# Patient Record
Sex: FEMALE | Race: BLACK OR AFRICAN AMERICAN | URBAN - METROPOLITAN AREA
[De-identification: names, ages, dates, MRNs, and addresses within clinical notes are randomized per-mention and may not be internally consistent; named-entity substitution may affect disease eponyms.]

---

## 2017-02-08 ENCOUNTER — TELEPHONE (OUTPATIENT)
Dept: FAMILY MEDICINE CLINIC | Age: 42
End: 2017-02-08

## 2017-02-08 DIAGNOSIS — R07.9 CHEST PAIN, UNSPECIFIED TYPE: Primary | ICD-10-CM

## 2017-02-17 ENCOUNTER — OFFICE VISIT (OUTPATIENT)
Dept: CARDIOLOGY CLINIC | Age: 42
End: 2017-02-17

## 2017-02-17 VITALS — SYSTOLIC BLOOD PRESSURE: 146 MMHG | HEART RATE: 90 BPM | DIASTOLIC BLOOD PRESSURE: 100 MMHG

## 2017-02-17 DIAGNOSIS — R07.9 CHEST PAIN, UNSPECIFIED TYPE: Primary | ICD-10-CM

## 2017-02-17 PROCEDURE — 93000 ELECTROCARDIOGRAM COMPLETE: CPT | Performed by: INTERNAL MEDICINE

## 2017-02-17 PROCEDURE — 99204 OFFICE O/P NEW MOD 45 MIN: CPT | Performed by: INTERNAL MEDICINE

## 2017-02-17 RX ORDER — IBUPROFEN 800 MG/1
800 TABLET ORAL EVERY 6 HOURS PRN
COMMUNITY
End: 2019-09-25

## 2017-03-06 ENCOUNTER — HOSPITAL ENCOUNTER (OUTPATIENT)
Dept: NON INVASIVE DIAGNOSTICS | Age: 42
Discharge: HOME | End: 2017-03-06
Attending: INTERNAL MEDICINE | Admitting: INTERNAL MEDICINE

## 2017-03-06 DIAGNOSIS — R07.9 CHEST PAIN: ICD-10-CM

## 2017-04-10 ENCOUNTER — TELEPHONE (OUTPATIENT)
Dept: FAMILY MEDICINE CLINIC | Age: 42
End: 2017-04-10

## 2017-08-02 ENCOUNTER — OFFICE VISIT (OUTPATIENT)
Dept: FAMILY MEDICINE CLINIC | Age: 42
End: 2017-08-02

## 2017-08-02 VITALS
HEIGHT: 70 IN | WEIGHT: 200.8 LBS | HEART RATE: 87 BPM | OXYGEN SATURATION: 97 % | DIASTOLIC BLOOD PRESSURE: 70 MMHG | SYSTOLIC BLOOD PRESSURE: 130 MMHG | BODY MASS INDEX: 28.75 KG/M2

## 2017-08-02 DIAGNOSIS — R53.83 FATIGUE, UNSPECIFIED TYPE: ICD-10-CM

## 2017-08-02 DIAGNOSIS — Z00.00 HEALTHCARE MAINTENANCE: Primary | ICD-10-CM

## 2017-08-02 DIAGNOSIS — E55.9 VITAMIN D DEFICIENCY: ICD-10-CM

## 2017-08-02 DIAGNOSIS — Z00.00 HEALTHCARE MAINTENANCE: ICD-10-CM

## 2017-08-02 LAB
A/G RATIO: 1.4 (ref 1.1–2.2)
ALBUMIN SERPL-MCNC: 4.3 G/DL (ref 3.4–5)
ALP BLD-CCNC: 47 U/L (ref 40–129)
ALT SERPL-CCNC: 19 U/L (ref 10–40)
ANION GAP SERPL CALCULATED.3IONS-SCNC: 13 MMOL/L (ref 3–16)
AST SERPL-CCNC: 19 U/L (ref 15–37)
BASOPHILS ABSOLUTE: 0 K/UL (ref 0–0.2)
BASOPHILS RELATIVE PERCENT: 0.4 %
BILIRUB SERPL-MCNC: 0.5 MG/DL (ref 0–1)
BUN BLDV-MCNC: 12 MG/DL (ref 7–20)
CALCIUM SERPL-MCNC: 9.5 MG/DL (ref 8.3–10.6)
CHLORIDE BLD-SCNC: 100 MMOL/L (ref 99–110)
CO2: 26 MMOL/L (ref 21–32)
CREAT SERPL-MCNC: 0.9 MG/DL (ref 0.6–1.1)
EOSINOPHILS ABSOLUTE: 0.2 K/UL (ref 0–0.6)
EOSINOPHILS RELATIVE PERCENT: 3.2 %
GFR AFRICAN AMERICAN: >60
GFR NON-AFRICAN AMERICAN: >60
GLOBULIN: 3 G/DL
GLUCOSE BLD-MCNC: 103 MG/DL (ref 70–99)
HCT VFR BLD CALC: 42.3 % (ref 36–48)
HEMOGLOBIN: 14.2 G/DL (ref 12–16)
LYMPHOCYTES ABSOLUTE: 1.6 K/UL (ref 1–5.1)
LYMPHOCYTES RELATIVE PERCENT: 30 %
MCH RBC QN AUTO: 30.3 PG (ref 26–34)
MCHC RBC AUTO-ENTMCNC: 33.6 G/DL (ref 31–36)
MCV RBC AUTO: 90 FL (ref 80–100)
MONOCYTES ABSOLUTE: 0.4 K/UL (ref 0–1.3)
MONOCYTES RELATIVE PERCENT: 8 %
NEUTROPHILS ABSOLUTE: 3 K/UL (ref 1.7–7.7)
NEUTROPHILS RELATIVE PERCENT: 58.4 %
PDW BLD-RTO: 13.8 % (ref 12.4–15.4)
PLATELET # BLD: 316 K/UL (ref 135–450)
PMV BLD AUTO: 7.8 FL (ref 5–10.5)
POTASSIUM SERPL-SCNC: 4.5 MMOL/L (ref 3.5–5.1)
RBC # BLD: 4.7 M/UL (ref 4–5.2)
SODIUM BLD-SCNC: 139 MMOL/L (ref 136–145)
T4 FREE: 1.1 NG/DL (ref 0.9–1.8)
TOTAL PROTEIN: 7.3 G/DL (ref 6.4–8.2)
TSH REFLEX: 1.28 UIU/ML (ref 0.27–4.2)
VITAMIN D 25-HYDROXY: 25.3 NG/ML
WBC # BLD: 5.2 K/UL (ref 4–11)

## 2017-08-02 PROCEDURE — 99396 PREV VISIT EST AGE 40-64: CPT | Performed by: NURSE PRACTITIONER

## 2017-08-02 ASSESSMENT — ENCOUNTER SYMPTOMS
DIARRHEA: 0
RHINORRHEA: 0
VOMITING: 0
SHORTNESS OF BREATH: 0
APNEA: 0
SINUS PRESSURE: 0
ABDOMINAL DISTENTION: 0
EYE REDNESS: 0
NAUSEA: 0
BLOOD IN STOOL: 0
BACK PAIN: 0
COUGH: 0
EYE PAIN: 0
WHEEZING: 0
CONSTIPATION: 0
CHEST TIGHTNESS: 0
ABDOMINAL PAIN: 0

## 2018-08-30 ENCOUNTER — OFFICE VISIT (OUTPATIENT)
Dept: FAMILY MEDICINE CLINIC | Age: 43
End: 2018-08-30

## 2018-08-30 VITALS
HEART RATE: 81 BPM | BODY MASS INDEX: 28.18 KG/M2 | DIASTOLIC BLOOD PRESSURE: 82 MMHG | HEIGHT: 70 IN | WEIGHT: 196.8 LBS | OXYGEN SATURATION: 98 % | SYSTOLIC BLOOD PRESSURE: 126 MMHG

## 2018-08-30 DIAGNOSIS — J45.40 MODERATE PERSISTENT ASTHMA WITHOUT COMPLICATION: ICD-10-CM

## 2018-08-30 DIAGNOSIS — R53.83 FATIGUE, UNSPECIFIED TYPE: ICD-10-CM

## 2018-08-30 DIAGNOSIS — Z00.00 ANNUAL PHYSICAL EXAM: ICD-10-CM

## 2018-08-30 DIAGNOSIS — R73.03 PREDIABETES: ICD-10-CM

## 2018-08-30 DIAGNOSIS — Z00.00 ANNUAL PHYSICAL EXAM: Primary | ICD-10-CM

## 2018-08-30 DIAGNOSIS — M54.2 CERVICALGIA: ICD-10-CM

## 2018-08-30 DIAGNOSIS — N85.2 UTERINE ENLARGEMENT: ICD-10-CM

## 2018-08-30 LAB
A/G RATIO: 1.5 (ref 1.1–2.2)
ALBUMIN SERPL-MCNC: 4.2 G/DL (ref 3.4–5)
ALP BLD-CCNC: 39 U/L (ref 40–129)
ALT SERPL-CCNC: 13 U/L (ref 10–40)
ANION GAP SERPL CALCULATED.3IONS-SCNC: 13 MMOL/L (ref 3–16)
AST SERPL-CCNC: 16 U/L (ref 15–37)
BILIRUB SERPL-MCNC: 0.3 MG/DL (ref 0–1)
BUN BLDV-MCNC: 9 MG/DL (ref 7–20)
CALCIUM SERPL-MCNC: 9.4 MG/DL (ref 8.3–10.6)
CHLORIDE BLD-SCNC: 102 MMOL/L (ref 99–110)
CHOLESTEROL, TOTAL: 213 MG/DL (ref 0–199)
CO2: 24 MMOL/L (ref 21–32)
CREAT SERPL-MCNC: 1 MG/DL (ref 0.6–1.1)
FOLATE: 17.77 NG/ML (ref 4.78–24.2)
GFR AFRICAN AMERICAN: >60
GFR NON-AFRICAN AMERICAN: >60
GLOBULIN: 2.8 G/DL
GLUCOSE BLD-MCNC: 96 MG/DL (ref 70–99)
HCT VFR BLD CALC: 44 % (ref 36–48)
HDLC SERPL-MCNC: 53 MG/DL (ref 40–60)
HEMOGLOBIN: 14.7 G/DL (ref 12–16)
LDL CHOLESTEROL CALCULATED: 143 MG/DL
MCH RBC QN AUTO: 30.5 PG (ref 26–34)
MCHC RBC AUTO-ENTMCNC: 33.5 G/DL (ref 31–36)
MCV RBC AUTO: 91 FL (ref 80–100)
PDW BLD-RTO: 12.3 % (ref 12.4–15.4)
PLATELET # BLD: 344 K/UL (ref 135–450)
PMV BLD AUTO: 8.1 FL (ref 5–10.5)
POTASSIUM SERPL-SCNC: 4.4 MMOL/L (ref 3.5–5.1)
RBC # BLD: 4.83 M/UL (ref 4–5.2)
SODIUM BLD-SCNC: 139 MMOL/L (ref 136–145)
TOTAL PROTEIN: 7 G/DL (ref 6.4–8.2)
TRIGL SERPL-MCNC: 87 MG/DL (ref 0–150)
TSH REFLEX: 1.25 UIU/ML (ref 0.27–4.2)
VITAMIN B-12: 211 PG/ML (ref 211–911)
VLDLC SERPL CALC-MCNC: 17 MG/DL
WBC # BLD: 5.8 K/UL (ref 4–11)

## 2018-08-30 PROCEDURE — 99396 PREV VISIT EST AGE 40-64: CPT | Performed by: FAMILY MEDICINE

## 2018-08-30 RX ORDER — BUDESONIDE AND FORMOTEROL FUMARATE DIHYDRATE 160; 4.5 UG/1; UG/1
2 AEROSOL RESPIRATORY (INHALATION) 2 TIMES DAILY
Qty: 1 INHALER | Refills: 11 | Status: SHIPPED | OUTPATIENT
Start: 2018-08-30 | End: 2021-07-29 | Stop reason: SDUPTHER

## 2018-08-30 RX ORDER — ALBUTEROL SULFATE 90 UG/1
2 AEROSOL, METERED RESPIRATORY (INHALATION) EVERY 4 HOURS PRN
Qty: 1 INHALER | Refills: 11 | Status: SHIPPED | OUTPATIENT
Start: 2018-08-30 | End: 2021-07-29 | Stop reason: SDUPTHER

## 2018-08-30 RX ORDER — METAXALONE 800 MG/1
800 TABLET ORAL 3 TIMES DAILY
Qty: 30 TABLET | Refills: 0 | Status: SHIPPED | OUTPATIENT
Start: 2018-08-30 | End: 2018-09-07

## 2018-08-30 RX ORDER — NAPROXEN 500 MG/1
500 TABLET ORAL 2 TIMES DAILY WITH MEALS
Qty: 60 TABLET | Refills: 11 | Status: SHIPPED | OUTPATIENT
Start: 2018-08-30 | End: 2019-12-06 | Stop reason: SDUPTHER

## 2018-08-30 ASSESSMENT — PATIENT HEALTH QUESTIONNAIRE - PHQ9
SUM OF ALL RESPONSES TO PHQ QUESTIONS 1-9: 0
SUM OF ALL RESPONSES TO PHQ QUESTIONS 1-9: 0
SUM OF ALL RESPONSES TO PHQ9 QUESTIONS 1 & 2: 0
2. FEELING DOWN, DEPRESSED OR HOPELESS: 0
1. LITTLE INTEREST OR PLEASURE IN DOING THINGS: 0

## 2018-08-30 NOTE — LETTER
[facility] [address]  Phone: [phone]  Fax: [phone]    Melina Rollins MD        August 30, 2018     Patient: Jane Pires   YOB: 1975   Date of Visit: 8/30/2018       To Whom It May Concern: It is my medical opinion that Jane Pires requires a disability parking placard for the following reasons:  She cannot walk 200 feet without stopping to rest.  Duration of need: permanent    If you have any questions or concerns, please don't hesitate to call.     Sincerely,         Melina Rollins MD

## 2018-08-31 ENCOUNTER — TELEPHONE (OUTPATIENT)
Dept: FAMILY MEDICINE CLINIC | Age: 43
End: 2018-08-31

## 2018-08-31 LAB
ESTIMATED AVERAGE GLUCOSE: 122.6 MG/DL
HBA1C MFR BLD: 5.9 %

## 2018-09-02 ASSESSMENT — ENCOUNTER SYMPTOMS: RESPIRATORY NEGATIVE: 1

## 2018-09-06 ENCOUNTER — PATIENT MESSAGE (OUTPATIENT)
Dept: FAMILY MEDICINE CLINIC | Age: 43
End: 2018-09-06

## 2018-09-12 ENCOUNTER — HOSPITAL ENCOUNTER (OUTPATIENT)
Dept: PHYSICAL THERAPY | Age: 43
Setting detail: THERAPIES SERIES
Discharge: HOME | End: 2018-09-12
Payer: COMMERCIAL

## 2018-09-12 PROCEDURE — 97110 THERAPEUTIC EXERCISES: CPT

## 2018-09-12 PROCEDURE — G8981 BODY POS CURRENT STATUS: HCPCS

## 2018-09-12 PROCEDURE — 97012 MECHANICAL TRACTION THERAPY: CPT

## 2018-09-12 PROCEDURE — 97161 PT EVAL LOW COMPLEX 20 MIN: CPT

## 2018-09-12 PROCEDURE — G8982 BODY POS GOAL STATUS: HCPCS

## 2018-09-12 RX ORDER — BACLOFEN 10 MG/1
10 TABLET ORAL 3 TIMES DAILY
Qty: 30 TABLET | Refills: 1 | Status: SHIPPED | OUTPATIENT
Start: 2018-09-12 | End: 2020-09-17

## 2018-09-12 RX ORDER — METAXALONE 800 MG/1
800 TABLET ORAL 3 TIMES DAILY
Qty: 30 TABLET | Refills: 0 | Status: SHIPPED | OUTPATIENT
Start: 2018-09-12 | End: 2018-09-22

## 2018-09-12 NOTE — PLAN OF CARE
disability  Functional Limitation: Changing and maintaining body position  Changing and Maintaining Body Position Current Status (): At least 40 percent but less than 60 percent impaired, limited or restricted  Changing and Maintaining Body Position Goal Status (): At least 1 percent but less than 20 percent impaired, limited or restricted    Pain Scale: 7/10  Easing factors: massage  Provocative factors: using arm, typing      Type: [x]Constant   []Intermittent  []Radiating []Localized []other:     Numbness/Tingling: right lateral forearm, right shoulder blade    Occupation/School:  office work    Living Status/Prior Level of Function: Independent with ADLs and IADLs. Yard work, video exercises. OBJECTIVE:     CERV ROM     Cervical Flexion 50    Cervical Extension 60    Cervical SB Pain on R with the R L pain free   Cervical rotation 100% pain on R withR rotation 100% L        ROM Left Right   Shoulder Flex WNL WNL   Shoulder Abd     Shoulder ER     Shoulder IR               Strength  Left Right   Shoulder Flex 5/5 5/5   Shoulder Scap 4/5 4/5   Shoulder ER 4/5 4/5   Shoulder IR 4+/5 4+/5             Reflexes Left Right   C5-6 Biceps     C5-6 Brachioradialis     C7-8 Triceps       Reflexes/Sensation:    [x]Dermatomes/Myotomes intact    [x]Reflexes equal and normal bilaterally   []Other:    Joint mobility:   []Normal    [x]Hypo   []Hyper    Palpation: right scapular region TTP    Functional Mobility/Transfers: I with tranfers    Posture: rolled shoulders, forward head    Bandages/Dressings/Incisions: NA    Gait: (include devices/WB status): WNL     Orthopedic Special Tests:   Alar lig test- neg for instability  Sharp's brandi- neg for instability  Vert art test- neg for sx B  Spurling's test- neg R, neg L  Cervical compression- no change  Cervical distraction- min relief                         [x] Patient history, allergies, meds reviewed. Medical chart reviewed. See intake form.      Review Of Systems (ROS):  [x]Performed Review of systems (Integumentary, CardioPulmonary, Neurological) by intake and observation. Intake form has been scanned into medical record. Patient has been instructed to contact their primary care physician regarding ROS issues if not already being addressed at this time. Co-morbidities/Complexities (which will affect course of rehabilitation):  []None           Arthritic conditions   []Rheumatoid arthritis (M05.9)  []Osteoarthritis (M19.91)   Cardiovascular conditions   []Hypertension (I10)  []Hyperlipidemia (E78.5)  []Angina pectoris (I20)  []Atherosclerosis (I70)   Musculoskeletal conditions   []Disc pathology   []Congenital spine pathologies   []Prior surgical intervention  []Osteoporosis (M81.8)  []Osteopenia (M85.8)   Endocrine conditions   []Hypothyroid (E03.9)  []Hyperthyroid Gastrointestinal conditions   []Constipation (Q96.96)   Metabolic conditions   []Morbid obesity (E66.01)  []Diabetes type 1(E10.65) or 2 (E11.65)   []Neuropathy (G60.9)     Pulmonary conditions   [x]Asthma (J45)  []Coughing   []COPD (J44.9)   Psychological Disorders  []Anxiety (F41.9)  []Depression (F32.9)   []Other:   [x]Other:     RUE fx     Barriers to/and or personal factors that will affect rehab potential:              []Age  []Sex              []Motivation/Lack of Motivation                        []Co-Morbidities              []Cognitive Function, education/learning barriers              []Environmental, home barriers              []profession/work barriers  [x]past PT/medical experience  []other:  Justification: Hx PT for same thing, reports good understanding of HEP. Falls Risk Assessment (30 days):   [x] Falls Risk assessed and no intervention required.   [] Falls Risk assessed and Patient requires intervention due to being higher risk   TUG score (>12s at risk):     [] Falls education provided, including       G-Codes:  PT G-Codes  Functional Assessment Tool Used: NDI  Score: 46% disability  Functional Limitation: Changing and maintaining body position  Changing and Maintaining Body Position Current Status (): At least 40 percent but less than 60 percent impaired, limited or restricted  Changing and Maintaining Body Position Goal Status (): At least 1 percent but less than 20 percent impaired, limited or restricted    ASSESSMENT:    Functional Impairments:     []Noted cervical/thoracic/GHJ joint hypomobility   []Noted cervical/thoracic/GHJ joint hypermobility   [x]Decreased cervical/UE functional ROM   []Noted Headache pain aggravated by neck movements with/without dizziness   []Abnormal reflexes/sensation/myotomal/dermatomal deficits   []Decreased DCF control or ability to hold head up   [x]Decreased RC/scapular/core strength and neuromuscular control    [x]Decreased UE functional strength   []other:      Functional Activity Limitations (from functional questionnaire and intake)   []Reduced ability to tolerate prolonged functional positions   []Reduced ability or difficulty with changes of positions or transfers between positions   [x]Reduced ability to maintain good posture and demonstrate good body mechanics with sitting, bending, and lifting   [] Reduced ability or tolerance with driving and/or computer work   [x]Reduced ability to perform lifting, reaching, carrying tasks   []Reduced ability to concentrate   [x]Reduced ability to sleep    [x]Reduced ability to tolerate any impact through UE or spine   [x]Reduced ability to ambulate prolonged functional periods/distances   []other:    Participation Restrictions   []Reduced participation in self care activities   [x]Reduced participation in home management activities   [x]Reduced participation in work activities   []Reduced participation in social activities. []Reduced participation in sport/recreational activities.     Classification/Subgrouping:   []signs/symptoms consistent with neck pain with mobility deficits Deficits. 3. Patient will demonstrate an increase in postural awareness and control and activation of  Deep cervical stabilizers to allow for proper functional mobility as indicated by patients Functional Deficits. 4. Patient will return to functional activities without increased symptoms or restriction. 5. Patient will demonstrate good posture and ergonomics at desk for 30 minutes at a time without cues.      Electronically signed by:  Nora Mcallister PT

## 2018-09-12 NOTE — FLOWSHEET NOTE
[address] [place], [address]                Physical Therapy Daily Treatment Note  Date:  2018    Patient Name:  Steph SCHMITT [facility]"  :  1975  MRN: [id number]  Restrictions/Precautions:    Medical/Treatment Diagnosis Information:  · Diagnosis: Cervicalgia M54.2  · Treatment Diagnosis: Neck pain, 00.4  Insurance/Certification information:  PT Insurance Information: [employer]  Physician Information:  Referring Practitioner: Michael Coates  Plan of care signed (Y/N):     Date of Patient follow up with Physician:     G-Code (if applicable):      Date G-Code Applied:      PT G-Codes  Functional Assessment Tool Used: NDI  Score: 46% disability  Functional Limitation: Changing and maintaining body position  Changing and Maintaining Body Position Current Status (): At least 40 percent but less than 60 percent impaired, limited or restricted  Changing and Maintaining Body Position Goal Status ():  At least 1 percent but less than 20 percent impaired, limited or restricted    Progress Note: [x]  Yes  []  No  Next due by: Visit #10      Latex Allergy:  [x]NO      []YES  Preferred Language for Healthcare:   [x]English       []other:    Visit # Insurance Allowable   1 60     Pain level:  7/10     SUBJECTIVE:  See eval    OBJECTIVE: See eval  Observation:   Test measurements:      RESTRICTIONS/PRECAUTIONS: none noted    Exercises/Interventions:   Therapeutic Ex Sets/sec Reps Notes HEP   Supine chin tuck 3\" 8     TB extension  10 yellow    TB retractions  10     TB ER no money  10     Corner wall stretch 15\" 2 Not to push through sx                                                                                        Pt education 15'  Posture, work ergonomics, HEP with progression, goals of PT, use of ice/heat- pt stated understanding           Manual Intervention towards functional goals listed. [] Progression is slowed due to complexities listed. [] Progression has been slowed due to co-morbidities.   [x] Plan just implemented, too soon to assess goals progression  [] Other:     ASSESSMENT:  See eval    Treatment/Activity Tolerance:  [x] Patient tolerated treatment well [] Patient limited by fatique  [] Patient limited by pain  [] Patient limited by other medical complications  [] Other:     Prognosis: [x] Good [] Fair  [] Poor    Patient Requires Follow-up: [x] Yes  [] No    PLAN: See eval  [] Continue per plan of care [] Alter current plan (see comments)  [x] Plan of care initiated [] Hold pending MD visit [] Discharge    Electronically signed by: Garcia Santiago PT

## 2018-09-18 ENCOUNTER — HOSPITAL ENCOUNTER (OUTPATIENT)
Dept: PHYSICAL THERAPY | Age: 43
Setting detail: THERAPIES SERIES
Discharge: HOME | End: 2018-09-18
Payer: COMMERCIAL

## 2018-09-18 PROCEDURE — G0283 ELEC STIM OTHER THAN WOUND: HCPCS | Performed by: PHYSICAL THERAPIST

## 2018-09-18 PROCEDURE — 97012 MECHANICAL TRACTION THERAPY: CPT | Performed by: PHYSICAL THERAPIST

## 2018-09-18 PROCEDURE — 97110 THERAPEUTIC EXERCISES: CPT | Performed by: PHYSICAL THERAPIST

## 2018-09-18 PROCEDURE — 97140 MANUAL THERAPY 1/> REGIONS: CPT | Performed by: PHYSICAL THERAPIST

## 2018-09-18 NOTE — FLOWSHEET NOTE
/ Oscillations-Mobs:  G-I, II, III, IV (PA's, Inf., Post.)  [] (37233) Provided manual therapy to mobilize soft tissue/joints of cervical/CT, scapular GHJ and UE for the purpose of modulating pain, promoting relaxation,  increasing ROM, reducing/eliminating soft tissue swelling/inflammation/restriction, improving soft tissue extensibility and allowing for proper ROM for normal function with self care, reaching, carrying, lifting, house/yardwork, driving/computer work    Modalities:  Mechanical Cervical Traction: With the patient lying in hook-lying position holding shut-off switch the traction unit was pre-set at 16 lbs./ 8lbs of on/off cycle. The on/off time was pre-set at 30 seconds / 10 seconds. The traction unit was set at a duration of 10 minutes. The target goal of modality is to relieve intradiscal pressure and reduce radicular symptoms. Charges:  Timed Code Treatment Minutes: 30   Total Treatment Minutes: 60     [] EVAL  [x] BE(69793) x  2   [] IONTO  [] NMR (96375) x      [] VASO  [] Manual (29086) x       [] Other:  [] TA x       [x] Mech Traction (98186)  [] ES(attended) (31974)      [x] ES (un) (83115):     GOALS:  Patient stated goal: Less pain. Therapist goals for Patient:   Short Term Goals: To be achieved in: 2 weeks  1. Independent in HEP and progression per patient tolerance, in order to prevent re-injury. 2. Patient will have a decrease in pain to facilitate improvement in movement, function, and ADLs as indicated by Functional Deficits. Long Term Goals: To be achieved in: 10 weeks  1. Disability index score of 1% or less for the NDI to assist with reaching prior level of function. 2. Patient will demonstrate increased AROM to [facility] of cervical/thoracic spine to allow for proper joint functioning as indicated by patients Functional Deficits.    3. Patient will demonstrate an increase in postural awareness and control and activation of  Deep cervical stabilizers to allow for proper functional mobility as indicated by patients Functional Deficits. 4. Patient will return to functional activities without increased symptoms or restriction. 5. Patient will demonstrate good posture and ergonomics at desk for 30 minutes at a time without cues. Progression Towards Functional goals:  [] Patient is progressing as expected towards functional goals listed. [] Progression is slowed due to complexities listed. [] Progression has been slowed due to co-morbidities. [x] Plan just implemented, too soon to assess goals progression  [] Other:     ASSESSMENT:  Felt some better following txn today. DC'd some ex secondary to increased pain. Added TB ER.  Instructed not to push into pain with ex and function    Treatment/Activity Tolerance:  [x] Patient tolerated treatment well [] Patient limited by fatique  [] Patient limited by pain  [] Patient limited by other medical complications  [] Other:     Prognosis: [x] Good [] Fair  [] Poor    Patient Requires Follow-up: [x] Yes  [] No    PLAN:   [x] Continue per plan of care [] Alter current plan (see comments)  [] Plan of care initiated [] Hold pending MD visit [] Discharge    Electronically signed by: Lazara Hoover PT

## 2018-09-21 ENCOUNTER — HOSPITAL ENCOUNTER (OUTPATIENT)
Dept: PHYSICAL THERAPY | Age: 43
Setting detail: THERAPIES SERIES
End: 2018-09-21
Payer: COMMERCIAL

## 2018-09-24 ENCOUNTER — HOSPITAL ENCOUNTER (OUTPATIENT)
Dept: PHYSICAL THERAPY | Age: 43
Setting detail: THERAPIES SERIES
End: 2018-09-24
Payer: COMMERCIAL

## 2018-09-28 ENCOUNTER — APPOINTMENT (OUTPATIENT)
Dept: PHYSICAL THERAPY | Age: 43
End: 2018-09-28
Payer: COMMERCIAL

## 2018-10-02 ENCOUNTER — HOSPITAL ENCOUNTER (OUTPATIENT)
Dept: PHYSICAL THERAPY | Age: 43
Setting detail: THERAPIES SERIES
Discharge: HOME | End: 2018-10-02
Payer: COMMERCIAL

## 2018-10-02 PROCEDURE — G8982 BODY POS GOAL STATUS: HCPCS | Performed by: PHYSICAL THERAPIST

## 2018-10-02 PROCEDURE — G8981 BODY POS CURRENT STATUS: HCPCS | Performed by: PHYSICAL THERAPIST

## 2018-10-02 PROCEDURE — G8984 CARRY CURRENT STATUS: HCPCS | Performed by: PHYSICAL THERAPIST

## 2018-10-05 ENCOUNTER — APPOINTMENT (OUTPATIENT)
Dept: PHYSICAL THERAPY | Age: 43
End: 2018-10-05
Payer: COMMERCIAL

## 2018-10-09 ENCOUNTER — APPOINTMENT (OUTPATIENT)
Dept: PHYSICAL THERAPY | Age: 43
End: 2018-10-09
Payer: COMMERCIAL

## 2018-10-11 ENCOUNTER — APPOINTMENT (OUTPATIENT)
Dept: PHYSICAL THERAPY | Age: 43
End: 2018-10-11
Payer: COMMERCIAL

## 2018-12-27 ENCOUNTER — OFFICE VISIT (OUTPATIENT)
Dept: PULMONOLOGY | Age: 43
End: 2018-12-27
Payer: COMMERCIAL

## 2018-12-27 VITALS
HEIGHT: 70 IN | RESPIRATION RATE: 18 BRPM | BODY MASS INDEX: 29.63 KG/M2 | TEMPERATURE: 98.5 F | HEART RATE: 79 BPM | WEIGHT: 207 LBS | SYSTOLIC BLOOD PRESSURE: 159 MMHG | DIASTOLIC BLOOD PRESSURE: 96 MMHG | OXYGEN SATURATION: 100 %

## 2018-12-27 DIAGNOSIS — J40 BRONCHITIS: ICD-10-CM

## 2018-12-27 DIAGNOSIS — Z87.09 HISTORY OF PNEUMOTHORAX: ICD-10-CM

## 2018-12-27 DIAGNOSIS — J45.21 MILD INTERMITTENT ASTHMA WITH ACUTE EXACERBATION: ICD-10-CM

## 2018-12-27 PROCEDURE — 99214 OFFICE O/P EST MOD 30 MIN: CPT | Performed by: INTERNAL MEDICINE

## 2018-12-27 RX ORDER — DOXYCYCLINE HYCLATE 100 MG/1
100 CAPSULE ORAL DAILY
Qty: 10 CAPSULE | Refills: 3 | Status: SHIPPED | OUTPATIENT
Start: 2018-12-27 | End: 2019-01-06

## 2018-12-27 RX ORDER — PREDNISONE 10 MG/1
TABLET ORAL
Qty: 30 TABLET | Refills: 0 | Status: SHIPPED | OUTPATIENT
Start: 2018-12-27 | End: 2019-01-06

## 2019-01-11 DIAGNOSIS — R05.9 COUGH: Primary | ICD-10-CM

## 2019-01-11 RX ORDER — HYDROCODONE POLISTIREX AND CHLORPHENIRAMINE POLISTIREX 10; 8 MG/5ML; MG/5ML
5 SUSPENSION, EXTENDED RELEASE ORAL EVERY 12 HOURS PRN
Qty: 140 ML | Refills: 0 | Status: SHIPPED | OUTPATIENT
Start: 2019-01-11 | End: 2019-09-25

## 2019-08-23 ENCOUNTER — OFFICE VISIT (OUTPATIENT)
Dept: INTERNAL MEDICINE CLINIC | Age: 44
End: 2019-08-23
Payer: COMMERCIAL

## 2019-08-23 VITALS
BODY MASS INDEX: 27.84 KG/M2 | HEART RATE: 64 BPM | DIASTOLIC BLOOD PRESSURE: 70 MMHG | SYSTOLIC BLOOD PRESSURE: 138 MMHG | WEIGHT: 194 LBS

## 2019-08-23 DIAGNOSIS — Z00.00 HEALTHCARE MAINTENANCE: Primary | ICD-10-CM

## 2019-08-23 DIAGNOSIS — R20.0 RIGHT ARM NUMBNESS: ICD-10-CM

## 2019-08-23 DIAGNOSIS — J45.40 MODERATE PERSISTENT ASTHMA WITHOUT COMPLICATION: ICD-10-CM

## 2019-08-23 DIAGNOSIS — R07.9 CHEST PAIN, UNSPECIFIED TYPE: ICD-10-CM

## 2019-08-23 DIAGNOSIS — G89.29 CHRONIC NECK PAIN: ICD-10-CM

## 2019-08-23 DIAGNOSIS — Z00.00 HEALTHCARE MAINTENANCE: ICD-10-CM

## 2019-08-23 DIAGNOSIS — M54.2 CHRONIC NECK PAIN: ICD-10-CM

## 2019-08-23 LAB
A/G RATIO: 1.7 (ref 1.1–2.2)
ALBUMIN SERPL-MCNC: 4.7 G/DL (ref 3.4–5)
ALP BLD-CCNC: 35 U/L (ref 40–129)
ALT SERPL-CCNC: 21 U/L (ref 10–40)
ANION GAP SERPL CALCULATED.3IONS-SCNC: 13 MMOL/L (ref 3–16)
AST SERPL-CCNC: 25 U/L (ref 15–37)
BILIRUB SERPL-MCNC: 0.5 MG/DL (ref 0–1)
BUN BLDV-MCNC: 10 MG/DL (ref 7–20)
CALCIUM SERPL-MCNC: 9.6 MG/DL (ref 8.3–10.6)
CHLORIDE BLD-SCNC: 99 MMOL/L (ref 99–110)
CHOLESTEROL, TOTAL: 196 MG/DL (ref 0–199)
CO2: 25 MMOL/L (ref 21–32)
CREAT SERPL-MCNC: 0.9 MG/DL (ref 0.6–1.1)
GFR AFRICAN AMERICAN: >60
GFR NON-AFRICAN AMERICAN: >60
GLOBULIN: 2.7 G/DL
GLUCOSE BLD-MCNC: 98 MG/DL (ref 70–99)
HCT VFR BLD CALC: 43.9 % (ref 36–48)
HDLC SERPL-MCNC: 80 MG/DL (ref 40–60)
HEMOGLOBIN: 14.8 G/DL (ref 12–16)
LDL CHOLESTEROL CALCULATED: 105 MG/DL
MCH RBC QN AUTO: 31.8 PG (ref 26–34)
MCHC RBC AUTO-ENTMCNC: 33.7 G/DL (ref 31–36)
MCV RBC AUTO: 94.3 FL (ref 80–100)
PDW BLD-RTO: 12.8 % (ref 12.4–15.4)
PLATELET # BLD: 301 K/UL (ref 135–450)
PMV BLD AUTO: 7.6 FL (ref 5–10.5)
POTASSIUM SERPL-SCNC: 4.3 MMOL/L (ref 3.5–5.1)
RBC # BLD: 4.65 M/UL (ref 4–5.2)
SODIUM BLD-SCNC: 137 MMOL/L (ref 136–145)
TOTAL PROTEIN: 7.4 G/DL (ref 6.4–8.2)
TRIGL SERPL-MCNC: 57 MG/DL (ref 0–150)
VITAMIN B-12: 265 PG/ML (ref 211–911)
VITAMIN D 25-HYDROXY: 32.3 NG/ML
VLDLC SERPL CALC-MCNC: 11 MG/DL
WBC # BLD: 5.5 K/UL (ref 4–11)

## 2019-08-23 PROCEDURE — 99396 PREV VISIT EST AGE 40-64: CPT | Performed by: NURSE PRACTITIONER

## 2019-08-23 PROCEDURE — 93000 ELECTROCARDIOGRAM COMPLETE: CPT | Performed by: NURSE PRACTITIONER

## 2019-08-23 RX ORDER — NORETHINDRONE ACETATE AND ETHINYL ESTRADIOL 1.5-30(21)
KIT ORAL
Refills: 4 | COMMUNITY
Start: 2019-07-29 | End: 2022-06-01

## 2019-08-23 ASSESSMENT — ENCOUNTER SYMPTOMS
ABDOMINAL PAIN: 0
SHORTNESS OF BREATH: 0
DIARRHEA: 0
BLOOD IN STOOL: 0
CONSTIPATION: 0
NAUSEA: 0
COUGH: 0
BACK PAIN: 0
WHEEZING: 0
ABDOMINAL DISTENTION: 0
VOMITING: 0
CHEST TIGHTNESS: 0
RHINORRHEA: 0
APNEA: 0
EYE REDNESS: 0
EYE PAIN: 0
SINUS PRESSURE: 0

## 2019-08-23 ASSESSMENT — PATIENT HEALTH QUESTIONNAIRE - PHQ9
2. FEELING DOWN, DEPRESSED OR HOPELESS: 0
1. LITTLE INTEREST OR PLEASURE IN DOING THINGS: 0
SUM OF ALL RESPONSES TO PHQ QUESTIONS 1-9: 0
SUM OF ALL RESPONSES TO PHQ9 QUESTIONS 1 & 2: 0
SUM OF ALL RESPONSES TO PHQ QUESTIONS 1-9: 0

## 2019-08-23 NOTE — PROGRESS NOTES
Take 800 mg by mouth every 6 hours as needed for Pain       Current Facility-Administered Medications   Medication Dose Route Frequency Provider Last Rate Last Dose    acetaminophen (TYLENOL) tablet 650 mg  650 mg Oral Q6H PRN Joann Medina MD           No Known Allergies    Orders Only on 08/30/2018   Component Date Value Ref Range Status    Sodium 08/30/2018 139  136 - 145 mmol/L Final    Potassium 08/30/2018 4.4  3.5 - 5.1 mmol/L Final    Chloride 08/30/2018 102  99 - 110 mmol/L Final    CO2 08/30/2018 24  21 - 32 mmol/L Final    Anion Gap 08/30/2018 13  3 - 16 Final    Glucose 08/30/2018 96  70 - 99 mg/dL Final    BUN 08/30/2018 9  7 - 20 mg/dL Final    CREATININE 08/30/2018 1.0  0.6 - 1.1 mg/dL Final    GFR Non- 08/30/2018 >60  >60 Final    Comment: >60 mL/min/1.73m2 EGFR, calc. for ages 25 and older using the  MDRD formula (not corrected for weight), is valid for stable  renal function.  GFR  08/30/2018 >60  >60 Final    Comment: Chronic Kidney Disease: less than 60 ml/min/1.73 sq.m. Kidney Failure: less than 15 ml/min/1.73 sq.m. Results valid for patients 18 years and older.       Calcium 08/30/2018 9.4  8.3 - 10.6 mg/dL Final    Total Protein 08/30/2018 7.0  6.4 - 8.2 g/dL Final    Alb 08/30/2018 4.2  3.4 - 5.0 g/dL Final    Albumin/Globulin Ratio 08/30/2018 1.5  1.1 - 2.2 Final    Total Bilirubin 08/30/2018 0.3  0.0 - 1.0 mg/dL Final    Alkaline Phosphatase 08/30/2018 39* 40 - 129 U/L Final    ALT 08/30/2018 13  10 - 40 U/L Final    AST 08/30/2018 16  15 - 37 U/L Final    Globulin 08/30/2018 2.8  g/dL Final    Cholesterol, Total 08/30/2018 213* 0 - 199 mg/dL Final    Triglycerides 08/30/2018 87  0 - 150 mg/dL Final    HDL 08/30/2018 53  40 - 60 mg/dL Final    LDL Calculated 08/30/2018 143* <100 mg/dL Final    VLDL Cholesterol Calculated 08/30/2018 17  Not Established mg/dL Final    WBC 08/30/2018 5.8  4.0 - 11.0 K/uL Final    RBC 08/30/2018 4.83  4.00 - 5.20 M/uL Final    Hemoglobin 08/30/2018 14.7  12.0 - 16.0 g/dL Final    Hematocrit 08/30/2018 44.0  36.0 - 48.0 % Final    MCV 08/30/2018 91.0  80.0 - 100.0 fL Final    MCH 08/30/2018 30.5  26.0 - 34.0 pg Final    MCHC 08/30/2018 33.5  31.0 - 36.0 g/dL Final    RDW 08/30/2018 12.3* 12.4 - 15.4 % Final    Platelets 68/72/1209 344  135 - 450 K/uL Final    MPV 08/30/2018 8.1  5.0 - 10.5 fL Final    TSH 08/30/2018 1.25  0.27 - 4.20 uIU/mL Final    Vitamin B-12 08/30/2018 211  211 - 911 pg/mL Final    Folate 08/30/2018 17.77  4.78 - 24.20 ng/mL Final    Comment: Effective 11-15-16 10:00am EST  Please note reference ranges have  changed for Folate.  Hemoglobin A1C 08/30/2018 5.9  See comment % Final    Comment: Comment:  Diagnosis of Diabetes: > or = 6.5%  Increased risk of diabetes (Prediabetes): 5.7-6.4%  Glycemic Control: Nonpregnant Adults: <7.0%                    Pregnant: <6.0%        eAG 08/30/2018 122.6  mg/dL Final       Review of Systems   Constitutional: Negative for appetite change, chills, fatigue and fever. HENT: Negative for congestion, ear pain, rhinorrhea and sinus pressure. Eyes: Negative for pain, redness and visual disturbance. Respiratory: Negative for apnea, cough, chest tightness, shortness of breath and wheezing. Cardiovascular: Positive for chest pain. Negative for palpitations and leg swelling. Gastrointestinal: Negative for abdominal distention, abdominal pain, blood in stool, constipation, diarrhea, nausea and vomiting. Endocrine: Negative for cold intolerance, heat intolerance, polydipsia, polyphagia and polyuria. Genitourinary: Negative for difficulty urinating, dysuria, enuresis, frequency, hematuria and urgency. Musculoskeletal: Positive for neck pain. Negative for back pain, gait problem and joint swelling. Skin: Negative for rash and wound.    Allergic/Immunologic: Negative for environmental allergies, food allergies and

## 2019-08-24 LAB
ESTIMATED AVERAGE GLUCOSE: 114 MG/DL
HBA1C MFR BLD: 5.6 %

## 2019-09-25 ENCOUNTER — OFFICE VISIT (OUTPATIENT)
Dept: INTERNAL MEDICINE CLINIC | Age: 44
End: 2019-09-25
Payer: COMMERCIAL

## 2019-09-25 VITALS
WEIGHT: 197 LBS | HEIGHT: 70 IN | SYSTOLIC BLOOD PRESSURE: 118 MMHG | DIASTOLIC BLOOD PRESSURE: 82 MMHG | BODY MASS INDEX: 28.2 KG/M2 | HEART RATE: 84 BPM

## 2019-09-25 DIAGNOSIS — R25.2 LEG CRAMPS: ICD-10-CM

## 2019-09-25 DIAGNOSIS — M79.605 BILATERAL LEG PAIN: ICD-10-CM

## 2019-09-25 DIAGNOSIS — Z78.9 HISTORY OF RECENT AIR TRAVEL: ICD-10-CM

## 2019-09-25 DIAGNOSIS — R25.2 LEG CRAMPS: Primary | ICD-10-CM

## 2019-09-25 DIAGNOSIS — M79.89 SWELLING OF LEFT LOWER EXTREMITY: ICD-10-CM

## 2019-09-25 DIAGNOSIS — M79.604 BILATERAL LEG PAIN: ICD-10-CM

## 2019-09-25 LAB
A/G RATIO: 1.4 (ref 1.1–2.2)
ALBUMIN SERPL-MCNC: 4.3 G/DL (ref 3.4–5)
ALP BLD-CCNC: 32 U/L (ref 40–129)
ALT SERPL-CCNC: 30 U/L (ref 10–40)
ANION GAP SERPL CALCULATED.3IONS-SCNC: 16 MMOL/L (ref 3–16)
AST SERPL-CCNC: 42 U/L (ref 15–37)
BILIRUB SERPL-MCNC: 0.5 MG/DL (ref 0–1)
BUN BLDV-MCNC: 12 MG/DL (ref 7–20)
CALCIUM SERPL-MCNC: 9.2 MG/DL (ref 8.3–10.6)
CHLORIDE BLD-SCNC: 101 MMOL/L (ref 99–110)
CO2: 20 MMOL/L (ref 21–32)
CREAT SERPL-MCNC: 1 MG/DL (ref 0.6–1.1)
D DIMER: <200 NG/ML DDU (ref 0–229)
GFR AFRICAN AMERICAN: >60
GFR NON-AFRICAN AMERICAN: >60
GLOBULIN: 3 G/DL
GLUCOSE BLD-MCNC: 97 MG/DL (ref 70–99)
MAGNESIUM: 2.3 MG/DL (ref 1.8–2.4)
POTASSIUM SERPL-SCNC: 5 MMOL/L (ref 3.5–5.1)
SODIUM BLD-SCNC: 137 MMOL/L (ref 136–145)
TOTAL PROTEIN: 7.3 G/DL (ref 6.4–8.2)

## 2019-09-25 PROCEDURE — 99214 OFFICE O/P EST MOD 30 MIN: CPT | Performed by: NURSE PRACTITIONER

## 2019-09-25 RX ORDER — METHOCARBAMOL 750 MG/1
750 TABLET, FILM COATED ORAL 3 TIMES DAILY
Qty: 90 TABLET | Refills: 1 | Status: SHIPPED | OUTPATIENT
Start: 2019-09-25 | End: 2020-09-17 | Stop reason: SDUPTHER

## 2019-09-25 RX ORDER — METHOCARBAMOL 750 MG/1
750 TABLET, FILM COATED ORAL 4 TIMES DAILY
COMMUNITY
End: 2019-09-25 | Stop reason: SDUPTHER

## 2019-09-25 ASSESSMENT — ENCOUNTER SYMPTOMS
CHEST TIGHTNESS: 0
COUGH: 0
WHEEZING: 0
SHORTNESS OF BREATH: 0

## 2019-09-25 NOTE — PROGRESS NOTES
D-dimerHPI:  9/25/2019    This is a 40 y. o.female   Chief Complaint   Patient presents with    Leg Pain     pt c/o bilateral leg cramps x 1 wk. legs, ankles, calves      HPI    Having intermittent bilateral leg cramps   Started 1 week ago   Not only at night   Feels like really bad charley horses   Denies numbness or tingling  Some left leg swelling  Recent flight to [facility] (Dad had emergency surgery)   Was not eating or drinking well during travel. Denies any personal or family history of blood clots    /82 (Site: Left Upper Arm, Position: Sitting, Cuff Size: Large Adult)   Pulse 84   Ht 5' 10\" (1.778 m)   Wt 197 lb (89.4 kg)   BMI 28.27 kg/m²     No Known Allergies    Current Outpatient Medications   Medication Sig Dispense Refill    methocarbamol (ROBAXIN) 750 MG tablet Take 1 tablet by mouth 3 times daily 90 tablet 1    BLISOVI FE 1.5/30 1.5-30 MG-MCG tablet TK 1 T PO D  4    baclofen (LIORESAL) 10 MG tablet Take 1 tablet by mouth 3 times daily 30 tablet 1    budesonide-formoterol (SYMBICORT) 160-4.5 MCG/ACT AERO Inhale 2 puffs into the lungs 2 times daily 1 Inhaler 11    albuterol sulfate HFA (VENTOLIN HFA) 108 (90 Base) MCG/ACT inhaler Inhale 2 puffs into the lungs every 4 hours as needed for Wheezing 1 Inhaler 11    naproxen (NAPROSYN) 500 MG tablet Take 1 tablet by mouth 2 times daily (with meals) 60 tablet 11    Bmdwik-DkWycu-Bmgmf-FA-Omega 3 (DUET DHA) 25-1 & 400 MG MISC Take 1 tablet by mouth       Current Facility-Administered Medications   Medication Dose Route Frequency Provider Last Rate Last Dose    acetaminophen (TYLENOL) tablet 650 mg  650 mg Oral Q6H PRN M Jules Ayala MD         Review of Systems   Constitutional: Negative for fatigue. Respiratory: Negative for cough, chest tightness, shortness of breath and wheezing. Cardiovascular: Positive for leg swelling. Negative for chest pain and palpitations.    Musculoskeletal:        Bilateral leg cramps   Neurological: Negative

## 2019-12-09 ENCOUNTER — PATIENT MESSAGE (OUTPATIENT)
Dept: INTERNAL MEDICINE CLINIC | Age: 44
End: 2019-12-09

## 2019-12-09 RX ORDER — NAPROXEN 500 MG/1
TABLET ORAL
Qty: 60 TABLET | Refills: 0 | Status: SHIPPED | OUTPATIENT
Start: 2019-12-09 | End: 2020-04-23 | Stop reason: SDUPTHER

## 2019-12-09 RX ORDER — NAPROXEN 500 MG/1
TABLET ORAL
Qty: 60 TABLET | Refills: 0 | Status: SHIPPED | OUTPATIENT
Start: 2019-12-09 | End: 2019-12-09 | Stop reason: SDUPTHER

## 2020-04-23 RX ORDER — NAPROXEN 500 MG/1
TABLET ORAL
Qty: 60 TABLET | Refills: 0 | Status: SHIPPED | OUTPATIENT
Start: 2020-04-23 | End: 2020-05-28

## 2020-05-28 RX ORDER — NAPROXEN 500 MG/1
TABLET ORAL
Qty: 60 TABLET | Refills: 0 | Status: SHIPPED | OUTPATIENT
Start: 2020-05-28 | End: 2020-09-17 | Stop reason: SDUPTHER

## 2020-07-10 ENCOUNTER — VIRTUAL VISIT (OUTPATIENT)
Dept: PULMONOLOGY | Age: 45
End: 2020-07-10
Payer: COMMERCIAL

## 2020-07-10 PROCEDURE — 99214 OFFICE O/P EST MOD 30 MIN: CPT | Performed by: INTERNAL MEDICINE

## 2020-07-10 RX ORDER — ALBUTEROL SULFATE 90 UG/1
2 AEROSOL, METERED RESPIRATORY (INHALATION) EVERY 6 HOURS PRN
Qty: 1 INHALER | Refills: 6 | Status: SHIPPED | OUTPATIENT
Start: 2020-07-10 | End: 2021-07-29 | Stop reason: SDUPTHER

## 2020-07-10 RX ORDER — BUDESONIDE AND FORMOTEROL FUMARATE DIHYDRATE 160; 4.5 UG/1; UG/1
2 AEROSOL RESPIRATORY (INHALATION) 2 TIMES DAILY
Qty: 3 INHALER | Refills: 1 | Status: SHIPPED | OUTPATIENT
Start: 2020-07-10 | End: 2021-07-29 | Stop reason: SDUPTHER

## 2020-07-10 RX ORDER — PREDNISONE 10 MG/1
TABLET ORAL
Qty: 30 TABLET | Refills: 0 | Status: SHIPPED | OUTPATIENT
Start: 2020-07-10 | End: 2020-07-20

## 2020-07-10 NOTE — PROGRESS NOTES
[facility]  Follow Up Note  Erika Mei MD    Pulmonology Video Visit    Pursuant to the emergency declaration under the [facility] waiver authority and the Coronavirus Preparedness and Response Supplemental Appropriations Act this Video Visit was insisted, with patient's consent, to reduce the patient's risk of exposure to COVID-19 and provide continuity of care for an established patient. The patient was at home, while the provider was at the clinic. Services were provided through a synchronous discussion through a Video Visit to substitute for in-person clinic visit, and coded as such. Jasmeet Rosario   YOB: 1975    Date of Visit:  7/10/2020    Assessment/Plan:  1. Chest pain  Have her get CXR  She does have history of surgery and PTX.    2. Mild intermittent asthma with acute exacerbation  Complains of intermittent wheezing  Resume Symbicort and albuterol    3. History of pneumothorax  Have her repeat chest x-ray  Last CT scan in 2016 showed small chronic pneumothorax. Chief Complaint   Patient presents with    Shortness of Breath    Chest Pain     R side center location of chest Can't take a deep breath because of this Sx's started 6 months ago Cannot lay on her R side due to the pain    Pleurisy     where shr had pleurisy has discomfort in that area as well     Discuss Medications     ? if Dr Sorin Workman could refill her Naproxyn It seems to work in the past for her discomfort. HPI  Patient presents with a chief complaint of chest pain. This is been going on for about 6 months. She describes the pain is intermittent. It is mostly right-sided. She has a history of pneumothorax and surgery on that side. She finds Naprosyn effective and the pain does not last very long. She also has as a modifying factor a history of asthma. Asthma control is been poor.   She ran out of her medicine and has not been taking the Symbicort or the albuterol. There is no complaint of nausea or vomiting. Is not having heartburn and not complaining of sinus allergies. .    Review of Systems  As reviewed in HPI    History  I have reviewed past medical, surgical, social and family history. This is documented elsewhere in the medical record. Physical Exam:   Video visit    No Known Allergies  Prior to Visit Medications    Medication Sig Taking? Authorizing Provider   naproxen (NAPROSYN) 500 MG tablet TAKE 1 TABLET BY MOUTH TWICE DAILY WITH MEALS  PADMAJA Zepeda CNP   methocarbamol (ROBAXIN) 750 MG tablet Take 1 tablet by mouth 3 times daily  PADMAJA Zepeda CNP   BLISOVI FE 1.5/30 1.5-30 MG-MCG tablet TK 1 T PO D  Historical Provider, MD   baclofen (LIORESAL) 10 MG tablet Take 1 tablet by mouth 3 times daily  Nayan Grant MD   budesonide-formoterol (SYMBICORT) 160-4.5 MCG/ACT AERO Inhale 2 puffs into the lungs 2 times daily  Nayan Grant MD   albuterol sulfate HFA (VENTOLIN HFA) 108 (90 Base) MCG/ACT inhaler Inhale 2 puffs into the lungs every 4 hours as needed for Wheezing  Nayan Grant MD   Zctobt-VqXtvb-Armfn-FA-Omega 3 (DUET DHA) 25-1 & 400 MG MISC Take 1 tablet by mouth  Historical Provider, MD       There were no vitals filed for this visit. There is no height or weight on file to calculate BMI.      Wt Readings from Last 3 Encounters:   09/25/19 197 lb (89.4 kg)   08/23/19 194 lb (88 kg)   12/27/18 207 lb (93.9 kg)     BP Readings from Last 3 Encounters:   09/25/19 118/82   08/23/19 138/70   12/27/18 (!) 159/96        Social History     Tobacco Use   Smoking Status Never Smoker   Smokeless Tobacco Never Used

## 2020-09-17 ENCOUNTER — OFFICE VISIT (OUTPATIENT)
Dept: INTERNAL MEDICINE CLINIC | Age: 45
End: 2020-09-17
Payer: COMMERCIAL

## 2020-09-17 VITALS
DIASTOLIC BLOOD PRESSURE: 80 MMHG | TEMPERATURE: 97 F | SYSTOLIC BLOOD PRESSURE: 132 MMHG | BODY MASS INDEX: 29.13 KG/M2 | WEIGHT: 203 LBS | HEART RATE: 98 BPM | OXYGEN SATURATION: 99 %

## 2020-09-17 DIAGNOSIS — Z00.00 HEALTHCARE MAINTENANCE: ICD-10-CM

## 2020-09-17 DIAGNOSIS — R53.83 FATIGUE, UNSPECIFIED TYPE: ICD-10-CM

## 2020-09-17 DIAGNOSIS — E55.9 VITAMIN D DEFICIENCY: ICD-10-CM

## 2020-09-17 PROBLEM — I10 HTN (HYPERTENSION), BENIGN: Status: ACTIVE | Noted: 2020-09-17

## 2020-09-17 LAB
A/G RATIO: 1.6 (ref 1.1–2.2)
ALBUMIN SERPL-MCNC: 4.2 G/DL (ref 3.4–5)
ALP BLD-CCNC: 37 U/L (ref 40–129)
ALT SERPL-CCNC: 23 U/L (ref 10–40)
ANION GAP SERPL CALCULATED.3IONS-SCNC: 11 MMOL/L (ref 3–16)
AST SERPL-CCNC: 24 U/L (ref 15–37)
BILIRUB SERPL-MCNC: 0.3 MG/DL (ref 0–1)
BUN BLDV-MCNC: 11 MG/DL (ref 7–20)
CALCIUM SERPL-MCNC: 9.3 MG/DL (ref 8.3–10.6)
CHLORIDE BLD-SCNC: 101 MMOL/L (ref 99–110)
CHOLESTEROL, TOTAL: 215 MG/DL (ref 0–199)
CO2: 24 MMOL/L (ref 21–32)
CREAT SERPL-MCNC: 0.8 MG/DL (ref 0.6–1.1)
GFR AFRICAN AMERICAN: >60
GFR NON-AFRICAN AMERICAN: >60
GLOBULIN: 2.6 G/DL
GLUCOSE BLD-MCNC: 94 MG/DL (ref 70–99)
HCT VFR BLD CALC: 43.5 % (ref 36–48)
HDLC SERPL-MCNC: 80 MG/DL (ref 40–60)
HEMOGLOBIN: 14.4 G/DL (ref 12–16)
LDL CHOLESTEROL CALCULATED: 122 MG/DL
MCH RBC QN AUTO: 31.3 PG (ref 26–34)
MCHC RBC AUTO-ENTMCNC: 33.2 G/DL (ref 31–36)
MCV RBC AUTO: 94.5 FL (ref 80–100)
PDW BLD-RTO: 13.6 % (ref 12.4–15.4)
PLATELET # BLD: 319 K/UL (ref 135–450)
PMV BLD AUTO: 7.8 FL (ref 5–10.5)
POTASSIUM SERPL-SCNC: 4.1 MMOL/L (ref 3.5–5.1)
RBC # BLD: 4.6 M/UL (ref 4–5.2)
SODIUM BLD-SCNC: 136 MMOL/L (ref 136–145)
T4 FREE: 1.2 NG/DL (ref 0.9–1.8)
TOTAL PROTEIN: 6.8 G/DL (ref 6.4–8.2)
TRIGL SERPL-MCNC: 67 MG/DL (ref 0–150)
TSH SERPL DL<=0.05 MIU/L-ACNC: 1.91 UIU/ML (ref 0.27–4.2)
VLDLC SERPL CALC-MCNC: 13 MG/DL
WBC # BLD: 5.4 K/UL (ref 4–11)

## 2020-09-17 PROCEDURE — 99396 PREV VISIT EST AGE 40-64: CPT | Performed by: NURSE PRACTITIONER

## 2020-09-17 RX ORDER — METHOCARBAMOL 750 MG/1
750 TABLET, FILM COATED ORAL 3 TIMES DAILY
Qty: 90 TABLET | Refills: 1 | Status: SHIPPED | OUTPATIENT
Start: 2020-09-17

## 2020-09-17 RX ORDER — NAPROXEN 500 MG/1
TABLET ORAL
Qty: 60 TABLET | Refills: 0 | Status: SHIPPED | OUTPATIENT
Start: 2020-09-17 | End: 2021-07-29 | Stop reason: SDUPTHER

## 2020-09-17 RX ORDER — HYDROCHLOROTHIAZIDE 12.5 MG/1
12.5 CAPSULE, GELATIN COATED ORAL EVERY MORNING
Qty: 90 CAPSULE | Refills: 1 | Status: SHIPPED | OUTPATIENT
Start: 2020-09-17 | End: 2021-07-29

## 2020-09-17 ASSESSMENT — ENCOUNTER SYMPTOMS
SHORTNESS OF BREATH: 0
CHEST TIGHTNESS: 0
WHEEZING: 0
COUGH: 0

## 2020-09-17 ASSESSMENT — PATIENT HEALTH QUESTIONNAIRE - PHQ9
SUM OF ALL RESPONSES TO PHQ9 QUESTIONS 1 & 2: 0
2. FEELING DOWN, DEPRESSED OR HOPELESS: 0
SUM OF ALL RESPONSES TO PHQ QUESTIONS 1-9: 0
1. LITTLE INTEREST OR PLEASURE IN DOING THINGS: 0
SUM OF ALL RESPONSES TO PHQ QUESTIONS 1-9: 0

## 2020-09-17 NOTE — PROGRESS NOTES
9/17/20     Chief Complaint   Patient presents with    Annual Exam    Weight Loss     weight that she just can't seem to lose     HPI     Here for annual exam  Overall feeling well    Diet: overall healthy - drinks lots of water   Increased soda and etoh during pandemic.   Exercise: decreased energy which makes exercise hard  Having trouble with weight loss   Using apps to count calories  Has tried adipex in the past     Lots of fatigue   Sleep habits have always been poor  Taking unisom with relief  Going to bed around - 11-1   Waking up around - 6-7  Waking up a few times per night to go to the [place] - falls back to sleep after  Pt reports she has been told she snores     History of uterine fibroids - seeing GYN at [facility]   Is on OCP with control and not having menses     She also reports intermittent BP elevations and mild swelling   Used to take OTC diuretic but stopped     No Known Allergies    Current Outpatient Medications   Medication Sig Dispense Refill    naproxen (NAPROSYN) 500 MG tablet TAKE 1 TABLET BY MOUTH TWICE DAILY WITH MEALS 60 tablet 0    methocarbamol (ROBAXIN) 750 MG tablet Take 1 tablet by mouth 3 times daily 90 tablet 1    hydroCHLOROthiazide (MICROZIDE) 12.5 MG capsule Take 1 capsule by mouth every morning 90 capsule 1    albuterol sulfate HFA (VENTOLIN HFA) 108 (90 Base) MCG/ACT inhaler Inhale 2 puffs into the lungs every 6 hours as needed for Wheezing 1 Inhaler 6    budesonide-formoterol (SYMBICORT) 160-4.5 MCG/ACT AERO Inhale 2 puffs into the lungs 2 times daily 3 Inhaler 1    BLISOVI FE 1.5/30 1.5-30 MG-MCG tablet TK 1 T PO D  4    budesonide-formoterol (SYMBICORT) 160-4.5 MCG/ACT AERO Inhale 2 puffs into the lungs 2 times daily 1 Inhaler 11    albuterol sulfate HFA (VENTOLIN HFA) 108 (90 Base) MCG/ACT inhaler Inhale 2 puffs into the lungs every 4 hours as needed for Wheezing 1 Inhaler 11    Osxjfj-FlPfgd-Bzaod-FA-Omega 3 (DUET DHA) 25-1 & 400 MG MISC Take 1 tablet by mouth Current Facility-Administered Medications   Medication Dose Route Frequency Provider Last Rate Last Dose    acetaminophen (TYLENOL) tablet 650 mg  650 mg Oral Q6H PRN M Lyssa Johnston MD         Review of Systems   Constitutional: Positive for fatigue. Negative for chills and fever. Respiratory: Negative for cough, chest tightness, shortness of breath and wheezing. Cardiovascular: Negative for chest pain, palpitations and leg swelling. Neurological: Negative for dizziness, tremors, light-headedness and headaches. Vitals:    09/17/20 0745   BP: 132/80   Pulse: 98   Temp: 97 °F (36.1 °C)   SpO2: 99%   Weight: 203 lb (92.1 kg)      Physical Exam  Vitals signs reviewed. Constitutional:       General: She is not in acute distress. Appearance: She is well-developed. She is not ill-appearing or diaphoretic. HENT:      Head: Normocephalic and atraumatic. Right Ear: Tympanic membrane and ear canal normal.      Left Ear: Tympanic membrane and ear canal normal.   Cardiovascular:      Rate and Rhythm: Normal rate and regular rhythm. Heart sounds: Normal heart sounds. No murmur. Pulmonary:      Effort: Pulmonary effort is normal. No respiratory distress. Breath sounds: Normal breath sounds. No wheezing or rhonchi. Musculoskeletal:      Right lower leg: No edema. Left lower leg: No edema. Skin:     General: Skin is warm and dry. Neurological:      General: No focal deficit present. Mental Status: She is alert and oriented to person, place, and time. Psychiatric:         Mood and Affect: Mood and affect normal.         Behavior: Behavior normal.       Assessment/Plan:  1. Healthcare maintenance  Overall healthy   Checking labs  Work on diet and exercise   - Comprehensive Metabolic Panel; Future  - Lipid Panel; Future  - CBC; Future  - Hemoglobin A1C; Future  - TSH without Reflex; Future  - Vitamin D 25 Hydroxy; Future  - T4, Free; Future  - Vitamin B12 & Folate;  Future  - Methylmalonic Acid, Serum; Future    2. Vitamin D deficiency  Stable, controlled on current regimen. - Vitamin D 25 Hydroxy; Future    3. Mild intermittent asthma with acute exacerbation  Stable, controlled on current regimen. 4. Fatigue, unspecified type  Stable, uncontrolled  Ongoing but reports worsening   Checking labs   Discussed sleep study - agreeable if labs WNL   - Comprehensive Metabolic Panel; Future  - Lipid Panel; Future  - CBC; Future  - Hemoglobin A1C; Future  - TSH without Reflex; Future  - Vitamin D 25 Hydroxy; Future  - T4, Free; Future  - Vitamin B12 & Folate; Future  - Methylmalonic Acid, Serum; Future    5. HTN (hypertension), benign  Stable, controlled on current regimen. Discussed medications with patient, who voiced understanding of their use and indications. All questions answered. Return in about 1 year (around 9/17/2021), or if symptoms worsen or fail to improve.       Electronically signed by PADMAJA Oro CNP on 9/17/2020 at 10:49 AM

## 2020-09-18 LAB
ESTIMATED AVERAGE GLUCOSE: 116.9 MG/DL
FOLATE: 16.69 NG/ML (ref 4.78–24.2)
HBA1C MFR BLD: 5.7 %
VITAMIN B-12: 344 PG/ML (ref 211–911)
VITAMIN D 25-HYDROXY: 30.8 NG/ML

## 2020-09-21 LAB — METHYLMALONIC ACID: 0.13 UMOL/L (ref 0–0.4)

## 2020-11-12 ENCOUNTER — OFFICE VISIT (OUTPATIENT)
Dept: PRIMARY CARE CLINIC | Age: 45
End: 2020-11-12
Payer: COMMERCIAL

## 2020-11-12 PROCEDURE — 99211 OFF/OP EST MAY X REQ PHY/QHP: CPT | Performed by: NURSE PRACTITIONER

## 2020-11-12 NOTE — PROGRESS NOTES
Nafisa Haynes received a viral test for COVID-19. They were educated on isolation and quarantine as appropriate. For any symptoms, they were directed to seek care from their PCP, given contact information to establish with a doctor, directed to an urgent care or the emergency room.

## 2020-11-12 NOTE — PATIENT INSTRUCTIONS

## 2020-11-13 ENCOUNTER — TELEPHONE (OUTPATIENT)
Dept: PULMONOLOGY | Age: 45
End: 2020-11-13

## 2020-11-13 RX ORDER — BENZONATATE 200 MG/1
200 CAPSULE ORAL 3 TIMES DAILY PRN
Qty: 30 CAPSULE | Refills: 0 | Status: SHIPPED | OUTPATIENT
Start: 2020-11-13 | End: 2020-11-20

## 2020-11-13 NOTE — TELEPHONE ENCOUNTER
This is the message that pt sent to her PCP yesterday. Should she just use OTC for her cough until COVID results are back?    need to know if I should get tested for COVID. I have a slight runny nose and I've lost majority of my taste and the ability to smell. I googled COVID symptoms and taste and smell came up.      I noticed it a couple of days ago but didn't think anything of it until last night. I don't taste anything that I've eaten except my coffee this morning. I took my temp yesterday and it was 98.4, took it today, 1st time was 99.1 and the 2nd time was 98.2     Today I was cleaning the bathroom with 100% bleach, I thought someone watered down the bleach because I didn't smell it so I also used [facility], still didn't smell anything. When my  came home he said the smell was so strong in the house that he couldn't come in. The bathroom is upstairs and he came through the basement door. I still don't smell the bleach or [facility] and I was upstairs working from home.      I haven't been around anyone that has symptoms. My concern is my lung condition.      Do I need to get tested and if yes where do I go?

## 2020-11-13 NOTE — TELEPHONE ENCOUNTER
Pt said she was tested for covid on 11/12, still waiting for results, she wants to know if she can get a script for her cough.     Ph # [phone]

## 2020-11-14 LAB — SARS-COV-2, NAA: NOT DETECTED

## 2021-07-29 ENCOUNTER — OFFICE VISIT (OUTPATIENT)
Dept: INTERNAL MEDICINE CLINIC | Age: 46
End: 2021-07-29
Payer: COMMERCIAL

## 2021-07-29 VITALS
WEIGHT: 202 LBS | SYSTOLIC BLOOD PRESSURE: 130 MMHG | DIASTOLIC BLOOD PRESSURE: 80 MMHG | HEIGHT: 70 IN | BODY MASS INDEX: 28.92 KG/M2 | HEART RATE: 56 BPM

## 2021-07-29 DIAGNOSIS — Z00.00 ANNUAL PHYSICAL EXAM: Primary | ICD-10-CM

## 2021-07-29 DIAGNOSIS — M54.41 CHRONIC RIGHT-SIDED LOW BACK PAIN WITH RIGHT-SIDED SCIATICA: ICD-10-CM

## 2021-07-29 DIAGNOSIS — N80.30 ENDOMETRIOSIS OF PELVIC PERITONEUM: ICD-10-CM

## 2021-07-29 DIAGNOSIS — Z00.00 ANNUAL PHYSICAL EXAM: ICD-10-CM

## 2021-07-29 DIAGNOSIS — N13.30 HYDRONEPHROSIS, UNSPECIFIED HYDRONEPHROSIS TYPE: ICD-10-CM

## 2021-07-29 DIAGNOSIS — R06.83 SNORES: ICD-10-CM

## 2021-07-29 DIAGNOSIS — J45.20 MILD INTERMITTENT ASTHMA WITHOUT COMPLICATION: ICD-10-CM

## 2021-07-29 DIAGNOSIS — G89.29 CHRONIC RIGHT-SIDED LOW BACK PAIN WITH RIGHT-SIDED SCIATICA: ICD-10-CM

## 2021-07-29 DIAGNOSIS — R53.82 CHRONIC FATIGUE: ICD-10-CM

## 2021-07-29 DIAGNOSIS — E55.9 VITAMIN D DEFICIENCY: ICD-10-CM

## 2021-07-29 PROBLEM — D25.0 INTRAMURAL, SUBMUCOUS, AND SUBSEROUS LEIOMYOMA OF UTERUS: Status: ACTIVE | Noted: 2018-06-20

## 2021-07-29 PROBLEM — D25.2 INTRAMURAL, SUBMUCOUS, AND SUBSEROUS LEIOMYOMA OF UTERUS: Status: ACTIVE | Noted: 2018-06-20

## 2021-07-29 PROBLEM — D25.1 INTRAMURAL, SUBMUCOUS, AND SUBSEROUS LEIOMYOMA OF UTERUS: Status: ACTIVE | Noted: 2018-06-20

## 2021-07-29 LAB
A/G RATIO: 1.4 (ref 1.1–2.2)
ALBUMIN SERPL-MCNC: 4.2 G/DL (ref 3.4–5)
ALP BLD-CCNC: 43 U/L (ref 40–129)
ALT SERPL-CCNC: 14 U/L (ref 10–40)
ANION GAP SERPL CALCULATED.3IONS-SCNC: 11 MMOL/L (ref 3–16)
AST SERPL-CCNC: 19 U/L (ref 15–37)
BILIRUB SERPL-MCNC: 0.4 MG/DL (ref 0–1)
BILIRUBIN URINE: NEGATIVE
BLOOD, URINE: NEGATIVE
BUN BLDV-MCNC: 11 MG/DL (ref 7–20)
CALCIUM SERPL-MCNC: 9.3 MG/DL (ref 8.3–10.6)
CHLORIDE BLD-SCNC: 101 MMOL/L (ref 99–110)
CHOLESTEROL, TOTAL: 209 MG/DL (ref 0–199)
CLARITY: CLEAR
CO2: 24 MMOL/L (ref 21–32)
COLOR: YELLOW
CREAT SERPL-MCNC: 1 MG/DL (ref 0.6–1.1)
FOLATE: 12.89 NG/ML (ref 4.78–24.2)
GFR AFRICAN AMERICAN: >60
GFR NON-AFRICAN AMERICAN: 60
GLOBULIN: 2.9 G/DL
GLUCOSE BLD-MCNC: 101 MG/DL (ref 70–99)
GLUCOSE URINE: NEGATIVE MG/DL
HCT VFR BLD CALC: 41.4 % (ref 36–48)
HDLC SERPL-MCNC: 84 MG/DL (ref 40–60)
HEMOGLOBIN: 13.8 G/DL (ref 12–16)
KETONES, URINE: NEGATIVE MG/DL
LDL CHOLESTEROL CALCULATED: 116 MG/DL
LEUKOCYTE ESTERASE, URINE: NEGATIVE
MCH RBC QN AUTO: 29.8 PG (ref 26–34)
MCHC RBC AUTO-ENTMCNC: 33.4 G/DL (ref 31–36)
MCV RBC AUTO: 89.4 FL (ref 80–100)
MICROSCOPIC EXAMINATION: NORMAL
NITRITE, URINE: NEGATIVE
PDW BLD-RTO: 13 % (ref 12.4–15.4)
PH UA: 7 (ref 5–8)
PLATELET # BLD: 302 K/UL (ref 135–450)
PMV BLD AUTO: 7.6 FL (ref 5–10.5)
POTASSIUM SERPL-SCNC: 4.5 MMOL/L (ref 3.5–5.1)
PROTEIN UA: NEGATIVE MG/DL
RBC # BLD: 4.63 M/UL (ref 4–5.2)
SODIUM BLD-SCNC: 136 MMOL/L (ref 136–145)
SPECIFIC GRAVITY UA: 1.01 (ref 1–1.03)
T4 FREE: 1.2 NG/DL (ref 0.9–1.8)
TOTAL PROTEIN: 7.1 G/DL (ref 6.4–8.2)
TRIGL SERPL-MCNC: 46 MG/DL (ref 0–150)
TSH SERPL DL<=0.05 MIU/L-ACNC: 1.92 UIU/ML (ref 0.27–4.2)
URINE TYPE: NORMAL
UROBILINOGEN, URINE: 0.2 E.U./DL
VITAMIN B-12: 346 PG/ML (ref 211–911)
VITAMIN D 25-HYDROXY: 25.7 NG/ML
VLDLC SERPL CALC-MCNC: 9 MG/DL
WBC # BLD: 6 K/UL (ref 4–11)

## 2021-07-29 PROCEDURE — 99396 PREV VISIT EST AGE 40-64: CPT | Performed by: NURSE PRACTITIONER

## 2021-07-29 RX ORDER — NAPROXEN 500 MG/1
TABLET ORAL
Qty: 60 TABLET | Refills: 5 | Status: SHIPPED | OUTPATIENT
Start: 2021-07-29

## 2021-07-29 RX ORDER — ALBUTEROL SULFATE 90 UG/1
2 AEROSOL, METERED RESPIRATORY (INHALATION) EVERY 6 HOURS PRN
Qty: 1 INHALER | Refills: 5 | Status: SHIPPED | OUTPATIENT
Start: 2021-07-29

## 2021-07-29 RX ORDER — BUDESONIDE AND FORMOTEROL FUMARATE DIHYDRATE 160; 4.5 UG/1; UG/1
2 AEROSOL RESPIRATORY (INHALATION) 2 TIMES DAILY
Qty: 1 INHALER | Refills: 5 | Status: SHIPPED | OUTPATIENT
Start: 2021-07-29

## 2021-07-29 SDOH — ECONOMIC STABILITY: FOOD INSECURITY: WITHIN THE PAST 12 MONTHS, THE FOOD YOU BOUGHT JUST DIDN'T LAST AND YOU DIDN'T HAVE MONEY TO GET MORE.: NEVER TRUE

## 2021-07-29 SDOH — ECONOMIC STABILITY: FOOD INSECURITY: WITHIN THE PAST 12 MONTHS, YOU WORRIED THAT YOUR FOOD WOULD RUN OUT BEFORE YOU GOT MONEY TO BUY MORE.: NEVER TRUE

## 2021-07-29 ASSESSMENT — ENCOUNTER SYMPTOMS
WHEEZING: 0
CHEST TIGHTNESS: 0
COUGH: 0
SHORTNESS OF BREATH: 0

## 2021-07-29 ASSESSMENT — PATIENT HEALTH QUESTIONNAIRE - PHQ9
SUM OF ALL RESPONSES TO PHQ QUESTIONS 1-9: 0
2. FEELING DOWN, DEPRESSED OR HOPELESS: 0
SUM OF ALL RESPONSES TO PHQ QUESTIONS 1-9: 0
SUM OF ALL RESPONSES TO PHQ9 QUESTIONS 1 & 2: 0
1. LITTLE INTEREST OR PLEASURE IN DOING THINGS: 0
SUM OF ALL RESPONSES TO PHQ QUESTIONS 1-9: 0

## 2021-07-29 ASSESSMENT — SOCIAL DETERMINANTS OF HEALTH (SDOH): HOW HARD IS IT FOR YOU TO PAY FOR THE VERY BASICS LIKE FOOD, HOUSING, MEDICAL CARE, AND HEATING?: NOT HARD AT ALL

## 2021-07-29 NOTE — PROGRESS NOTES
Inhale 2 puffs into the lungs 2 times daily 1 Inhaler 5    methocarbamol (ROBAXIN) 750 MG tablet Take 1 tablet by mouth 3 times daily 90 tablet 1    BLISOVI FE 1.5/30 1.5-30 MG-MCG tablet TK 1 T PO D  4    Npfeov-PmJbxx-Tamvh-FA-Omega 3 (DUET DHA) 25-1 & 400 MG MISC Take 1 tablet by mouth       Current Facility-Administered Medications   Medication Dose Route Frequency Provider Last Rate Last Admin    acetaminophen (TYLENOL) tablet 650 mg  650 mg Oral Q6H PRN M Nani Cody MD         Review of Systems   Constitutional: Positive for fatigue. Negative for chills, fever and unexpected weight change. Respiratory: Negative for cough, chest tightness, shortness of breath and wheezing. Cardiovascular: Negative for chest pain, palpitations and leg swelling. Neurological: Negative for dizziness, tremors, light-headedness and headaches. Vitals:    07/29/21 0827   BP: 130/80   Pulse: 56   Weight: 202 lb (91.6 kg)   Height: 5' 10\" (1.778 m)      Physical Exam  Vitals reviewed. Constitutional:       General: She is not in acute distress. Appearance: Normal appearance. She is well-developed. She is not ill-appearing or diaphoretic. HENT:      Head: Normocephalic and atraumatic. Cardiovascular:      Rate and Rhythm: Normal rate and regular rhythm. Heart sounds: Normal heart sounds. No murmur heard. Pulmonary:      Effort: Pulmonary effort is normal. No respiratory distress. Breath sounds: Normal breath sounds. No wheezing or rhonchi. Musculoskeletal:      Lumbar back: Tenderness present. No deformity, signs of trauma or spasms. Right lower leg: No edema. Left lower leg: No edema. Skin:     General: Skin is warm and dry. Neurological:      General: No focal deficit present. Mental Status: She is alert and oriented to person, place, and time. Mental status is at baseline.    Psychiatric:         Mood and Affect: Mood and affect normal.         Behavior: Behavior normal. Assessment/Plan:  Annual physical exam  Due for labs today - ordered  Declines immunizations  - Comprehensive Metabolic Panel; Future  - CBC; Future  - Lipid Panel; Future  - Hemoglobin A1C; Future  - Vitamin D 25 Hydroxy; Future  - TSH without Reflex; Future  - T4, Free; Future  - Vitamin B12 & Folate; Future  - Methylmalonic Acid, Serum; Future  - URINALYSIS; Future    Chronic right-sided low back pain with right-sided sciatica  Uncontrolled with acute exacerbation   Refilling naprosyn   Okay to use OTC lidocaine patches   Order for xray - pt reports she will not likely complete due to covid (although declines immunization)   Reviewed supportive care   - naproxen (NAPROSYN) 500 MG tablet; TAKE 1 TABLET BY MOUTH TWICE DAILY WITH MEALS  Dispense: 60 tablet; Refill: 5  - XR LUMBAR SPINE (2-3 VIEWS); Future    Vitamin D deficiency  Stable, controlled on current regimen. Checking labs     Chronic fatigue / Snores  Chronic and ongoing - reviewed labs from last year with pt. Checking labs again today. Pt  is in the room - reports snoring is worsening   Concern for SRINIVASA - declined referral for sleep study last year    Referral provided today and strongly encouraged to schedule  Reviewed risks and symptoms of uncontrolled SRINIVASA in detail    - Ambulatory referral to Sleep Medicine    Adult BMI 28.0-28.9 kg/sq m  Uncontrolled. Was working on diet and exercise - became frustrated. Weight is the same as last year. Checking labs again   Discussed that uncontrolled SRINIVASA can lead to difficulty with weight loss   Pt requested diet pills - does not meet criteria  Consider weight management referral if labs and sleep study are normal?   - Ambulatory referral to Sleep Medicine    Endometriosis of pelvic peritoneum/ Hydronephrosis, unspecified hydronephrosis type  Uncontrolled  Has been recommended pt have a hysterectomy due to size, symptoms and possible hydronephrosis.  Reviewed notes and MRI in care everywhere. Pt needs follow up appt to discuss options if declining a hysterectomy. Concern for hydronephrosis on MRI due to size of uterus - will need repeat imaging. Mild intermittent asthma without complication  Stable, controlled on current regimen. - albuterol sulfate HFA (VENTOLIN HFA) 108 (90 Base) MCG/ACT inhaler; Inhale 2 puffs into the lungs every 6 hours as needed for Wheezing  Dispense: 1 Inhaler; Refill: 5  - budesonide-formoterol (SYMBICORT) 160-4.5 MCG/ACT AERO; Inhale 2 puffs into the lungs 2 times daily  Dispense: 1 Inhaler; Refill: 5    Discussed medications with patient, who voiced understanding of their use and indications. All questions answered. Return in about 1 year (around 7/29/2022), or if symptoms worsen or fail to improve.       Electronically signed by PADMAJA Cooley CNP on 7/29/2021 at 3:36 PM

## 2021-07-30 LAB
ESTIMATED AVERAGE GLUCOSE: 122.6 MG/DL
HBA1C MFR BLD: 5.9 %

## 2021-08-01 LAB — METHYLMALONIC ACID: 0.14 UMOL/L (ref 0–0.4)

## 2021-10-19 ENCOUNTER — TELEPHONE (OUTPATIENT)
Dept: CARDIOLOGY CLINIC | Age: 46
End: 2021-10-19

## 2021-10-19 NOTE — TELEPHONE ENCOUNTER
Patient canceled appointment with Dr. Ruben Alvarez for 10/20/21 and will call back after Thanksgiving and reschedule for next year.   marcos

## 2022-01-12 ENCOUNTER — PATIENT MESSAGE (OUTPATIENT)
Dept: INTERNAL MEDICINE CLINIC | Age: 47
End: 2022-01-12

## 2022-01-12 RX ORDER — AMOXICILLIN AND CLAVULANATE POTASSIUM 875; 125 MG/1; MG/1
1 TABLET, FILM COATED ORAL 2 TIMES DAILY
Qty: 14 TABLET | Refills: 0 | Status: SHIPPED | OUTPATIENT
Start: 2022-01-12 | End: 2022-01-19

## 2022-01-12 NOTE — TELEPHONE ENCOUNTER
From: David Das  To: Palmira Luisana Tatum  Sent: 1/12/2022 9:59 AM EST  Subject: Ear infection    Hi-    I have been running or walking outside every day since 9/14/21, sun, rain, sleet or snow. I still walk or run every morning, even in freezing temps. I think I have a ear infection. I have no other symptoms except my inner ear hurting. Can something be called into the pharmacy?

## 2022-05-13 ENCOUNTER — HOSPITAL ENCOUNTER (OUTPATIENT)
Dept: GENERAL RADIOLOGY | Age: 47
Discharge: HOME | End: 2022-05-13
Payer: COMMERCIAL

## 2022-05-13 ENCOUNTER — HOSPITAL ENCOUNTER (OUTPATIENT)
Age: 47
Discharge: HOME | End: 2022-05-13
Payer: COMMERCIAL

## 2022-05-13 ENCOUNTER — OFFICE VISIT (OUTPATIENT)
Dept: PULMONOLOGY | Age: 47
End: 2022-05-13
Payer: COMMERCIAL

## 2022-05-13 VITALS — OXYGEN SATURATION: 98 % | HEART RATE: 80 BPM | SYSTOLIC BLOOD PRESSURE: 130 MMHG | DIASTOLIC BLOOD PRESSURE: 82 MMHG

## 2022-05-13 DIAGNOSIS — M54.41 CHRONIC RIGHT-SIDED LOW BACK PAIN WITH RIGHT-SIDED SCIATICA: ICD-10-CM

## 2022-05-13 DIAGNOSIS — J45.40 MODERATE PERSISTENT ASTHMA WITHOUT COMPLICATION: Primary | ICD-10-CM

## 2022-05-13 DIAGNOSIS — G89.29 CHRONIC RIGHT-SIDED LOW BACK PAIN WITH RIGHT-SIDED SCIATICA: ICD-10-CM

## 2022-05-13 DIAGNOSIS — R09.1 PLEURISY: ICD-10-CM

## 2022-05-13 DIAGNOSIS — J45.40 MODERATE PERSISTENT ASTHMA WITHOUT COMPLICATION: ICD-10-CM

## 2022-05-13 PROCEDURE — 99214 OFFICE O/P EST MOD 30 MIN: CPT | Performed by: INTERNAL MEDICINE

## 2022-05-13 PROCEDURE — 72100 X-RAY EXAM L-S SPINE 2/3 VWS: CPT

## 2022-05-13 PROCEDURE — 71046 X-RAY EXAM CHEST 2 VIEWS: CPT

## 2022-05-13 RX ORDER — PREDNISONE 10 MG/1
TABLET ORAL
Qty: 30 TABLET | Refills: 0 | Status: SHIPPED | OUTPATIENT
Start: 2022-05-13 | End: 2022-05-23

## 2022-05-13 RX ORDER — DOXYCYCLINE HYCLATE 100 MG/1
100 CAPSULE ORAL 2 TIMES DAILY
Qty: 20 CAPSULE | Refills: 0 | Status: SHIPPED | OUTPATIENT
Start: 2022-05-13 | End: 2022-05-23

## 2022-05-13 NOTE — PROGRESS NOTES
[facility]  Follow Up Note  Chidi Mckeon MD       Ayo Johansen   YOB: 1975    Date of Visit:  5/13/2022    Assessment/Plan:  1. Pleurisy  Her exam does sound like pleurisy  Some congestion as well  Prednisone taper  Doxycycline  Get a chest x-ray  - XR CHEST (2 VW); Future    2. Moderate persistent asthma without complication  Asthma a bit flared up because of this  Prednisone should help with that  Continue Symbicort and albuterol  - XR CHEST (2 VW); Future      Chief Complaint   Patient presents with    Pain     feels like it might be pleursey       HPI  The patient presents with a chief complaint of pleuritic right-sided chest pain. She notices this somewhat in the from but mostly under her arm. Its been going on for a couple of weeks now. She really has not had much in the way of associated cough or mucus. No fevers or chills reported. She has been taking her Symbicort and Ventolin but feels like it has not worked quite as much. This feels different than the typical flareup. She had a typical flareup earlier in the winter that responded to treatment from her PCP. Review of Systems  As reviewed in HPI    History  I have reviewed past medical, surgical, social and family history. This is documented elsewhere in the medical record. Physical Exam:  Well developed, well nourished  Alert and oriented  Sclera is clear  No cervical adenopathy  No JVD. Chest examination is some squeaks and coarse breath sounds in the right middle lobe which could be consistent with pleurisy. .  Cardiac examination reveals regular rate and rhythm without murmur, gallop or rub. The abdomen is soft, nontender and nondistended. There is no clubbing, cyanosis or edema of the extremities. There is no obvious skin rash. No focal neuro deficicts  Normal mood and affect    No Known Allergies  Prior to Visit Medications    Medication Sig Taking?  Authorizing Provider   predniSONE (DELTASONE) 10 MG tablet Take 40 mg by mouth for 3 days 30 mg for 3 days 20 mg for 3 days 10 mg for 3 days. Yes Hardik Yates MD   doxycycline hyclate (VIBRAMYCIN) 100 MG capsule Take 1 capsule by mouth 2 times daily for 10 days Yes Hardik Yates MD   naproxen (NAPROSYN) 500 MG tablet TAKE 1 TABLET BY MOUTH TWICE DAILY WITH MEALS  Sabina Grams, APRN - CNP   albuterol sulfate HFA (VENTOLIN HFA) 108 (90 Base) MCG/ACT inhaler Inhale 2 puffs into the lungs every 6 hours as needed for Wheezing  Sabina Grams, APRN - CNP   budesonide-formoterol (SYMBICORT) 160-4.5 MCG/ACT AERO Inhale 2 puffs into the lungs 2 times daily  Sabina Grams, APRN - CNP   methocarbamol (ROBAXIN) 750 MG tablet Take 1 tablet by mouth 3 times daily  Sabina Grams, APRN - CNP   BLISOVI FE 1.5/30 1.5-30 MG-MCG tablet TK 1 T PO D  Historical Provider, MD   Asqkgo-MrPoxv-Lxtgh-FA-Omega 3 (DUET DHA) 25-1 & 400 MG MISC Take 1 tablet by mouth  Historical Provider, MD       Vitals:    05/13/22 0929   BP: 130/82   Pulse: 80   SpO2: 98%     There is no height or weight on file to calculate BMI.      Wt Readings from Last 3 Encounters:   07/29/21 202 lb (91.6 kg)   09/17/20 203 lb (92.1 kg)   09/25/19 197 lb (89.4 kg)     BP Readings from Last 3 Encounters:   05/13/22 130/82   07/29/21 130/80   09/17/20 132/80        Social History     Tobacco Use   Smoking Status Never Smoker   Smokeless Tobacco Never Used

## 2022-05-16 ENCOUNTER — PATIENT MESSAGE (OUTPATIENT)
Dept: INTERNAL MEDICINE CLINIC | Age: 47
End: 2022-05-16

## 2022-05-16 NOTE — TELEPHONE ENCOUNTER
From: Fede Hunt  To: Pattie Roque  Sent: 5/16/2022 8:40 AM EDT  Subject: Question regarding XR Lumbar Spine    Hi- Im a little confused. When was this test ordered? I dont remember having an issue with my lower back. My upper back has always been my issue. I went to get a chest X-ray of my lung ordered by Dr Charlene Hein.

## 2022-05-31 SDOH — HEALTH STABILITY: PHYSICAL HEALTH: ON AVERAGE, HOW MANY DAYS PER WEEK DO YOU ENGAGE IN MODERATE TO STRENUOUS EXERCISE (LIKE A BRISK WALK)?: 7 DAYS

## 2022-05-31 SDOH — HEALTH STABILITY: PHYSICAL HEALTH: ON AVERAGE, HOW MANY MINUTES DO YOU ENGAGE IN EXERCISE AT THIS LEVEL?: 30 MIN

## 2022-06-01 ENCOUNTER — OFFICE VISIT (OUTPATIENT)
Dept: PRIMARY CARE CLINIC | Age: 47
End: 2022-06-01
Payer: COMMERCIAL

## 2022-06-01 VITALS
HEART RATE: 72 BPM | OXYGEN SATURATION: 97 % | DIASTOLIC BLOOD PRESSURE: 90 MMHG | WEIGHT: 198 LBS | SYSTOLIC BLOOD PRESSURE: 132 MMHG | HEIGHT: 70 IN | BODY MASS INDEX: 28.35 KG/M2

## 2022-06-01 DIAGNOSIS — R03.0 ELEVATED BP WITHOUT DIAGNOSIS OF HYPERTENSION: ICD-10-CM

## 2022-06-01 DIAGNOSIS — Z12.11 COLON CANCER SCREENING: ICD-10-CM

## 2022-06-01 DIAGNOSIS — J45.20 MILD INTERMITTENT ASTHMA WITHOUT COMPLICATION: Primary | ICD-10-CM

## 2022-06-01 DIAGNOSIS — Z23 NEED FOR PNEUMOCOCCAL VACCINE: ICD-10-CM

## 2022-06-01 DIAGNOSIS — Z87.09 HISTORY OF PNEUMOTHORAX: ICD-10-CM

## 2022-06-01 DIAGNOSIS — M50.30 DDD (DEGENERATIVE DISC DISEASE), CERVICAL: ICD-10-CM

## 2022-06-01 DIAGNOSIS — E66.3 OVERWEIGHT WITH BODY MASS INDEX (BMI) OF 28 TO 28.9 IN ADULT: ICD-10-CM

## 2022-06-01 DIAGNOSIS — G47.33 OSA (OBSTRUCTIVE SLEEP APNEA): ICD-10-CM

## 2022-06-01 PROCEDURE — 90471 IMMUNIZATION ADMIN: CPT | Performed by: INTERNAL MEDICINE

## 2022-06-01 PROCEDURE — 90677 PCV20 VACCINE IM: CPT | Performed by: INTERNAL MEDICINE

## 2022-06-01 PROCEDURE — 99214 OFFICE O/P EST MOD 30 MIN: CPT | Performed by: INTERNAL MEDICINE

## 2022-06-01 ASSESSMENT — ENCOUNTER SYMPTOMS
BLOOD IN STOOL: 0
WHEEZING: 0
EYE PAIN: 0
SORE THROAT: 0
EYE REDNESS: 0
CONSTIPATION: 0
SINUS PRESSURE: 0
NAUSEA: 0
COLOR CHANGE: 0
ABDOMINAL DISTENTION: 0
TROUBLE SWALLOWING: 0
ABDOMINAL PAIN: 0
SHORTNESS OF BREATH: 0
VOMITING: 0
COUGH: 0
DIARRHEA: 0
BACK PAIN: 0
CHEST TIGHTNESS: 0

## 2022-06-01 ASSESSMENT — PATIENT HEALTH QUESTIONNAIRE - PHQ9
SUM OF ALL RESPONSES TO PHQ QUESTIONS 1-9: 0
SUM OF ALL RESPONSES TO PHQ QUESTIONS 1-9: 0
SUM OF ALL RESPONSES TO PHQ9 QUESTIONS 1 & 2: 0
2. FEELING DOWN, DEPRESSED OR HOPELESS: 0
1. LITTLE INTEREST OR PLEASURE IN DOING THINGS: 0
SUM OF ALL RESPONSES TO PHQ QUESTIONS 1-9: 0
SUM OF ALL RESPONSES TO PHQ QUESTIONS 1-9: 0

## 2022-06-01 NOTE — ASSESSMENT & PLAN NOTE
/90  Patient may have untreated SRINIVASA  Patient will like to treat elevated blood pressure and SRINIVASA with weight loss  To verify blood pressure cuff accuracy  To keep outpatient BP log,  counseled on exercise and diet (including DASH diet)  Goal to achieve appropriate BMI  Patient agreed with plan with verbal understanding

## 2022-06-01 NOTE — ASSESSMENT & PLAN NOTE
Has apneic spells while snoring   patient will like to treat with weight loss  Patient informed that untreated sleep apnea would predispose him/her to high blood pressure, heart attacks, stroke, cardiac arrhythmia just to name a few.

## 2022-06-01 NOTE — ASSESSMENT & PLAN NOTE
Stable and controlled on Symbicort 160-4.5 mcg 2 puffs twice daily, albuterol 4 times daily as needed

## 2022-06-01 NOTE — PROGRESS NOTES
Jonathon Alexis (1975) is a 52 y.o. female   Hypertension     General health: This patient presents for check up and refills. The problem and medicine lists and chart were reviewed in detail. The patient has been worked up and treated for this/these condition/s and is compliant with taking the medication without any significant side effects. The patient's condition/s is/are chronic and unchanged and otherwise remains stable. Feels well with minor complaints. Main Problem Review - Asthma - Patient denies complaints or symptoms today. Patient mentions she is adherent with treatment. Other problems review    1. History of pneumothorax -patient denies any issues today she has has a history of 2 episodes of spontaneous pneumothorax s/p chest tube Rx. Patient sees a pulmonologist      Health Maintenance  Urinary Incontinence screen - NEG screen 6/1/2022  Annual retinal eye exam - 1/2022  Annual Dentist visit - 5/2022  Tobacco smoking - NO  Alcohol Misuse - NO  Illicit Drug Use- NO  Healthy Diet and physical activity - YES  Obesity Screen- screened 6/1/2022  Wears seat belt- YES  End of life directives discussed with patient. Mentions she has a will, living will, POA an all her paperwork is in order. The 10-year ASCVD risk score (Zach Rasmussen, et al., 2013) is: 0.8%    Values used to calculate the score:      Age: 52 years      Sex: Female      Is Non- : Yes      Diabetic: No      Tobacco smoker: No      Systolic Blood Pressure: 205 mmHg      Is BP treated: No      HDL Cholesterol: 84 mg/dL      Total Cholesterol: 209 mg/dL     Review of Systems   Constitutional: Negative for activity change, appetite change, chills, fatigue, fever and unexpected weight change. HENT: Negative for congestion, ear pain, postnasal drip, sinus pressure, sore throat, tinnitus and trouble swallowing. Eyes: Negative for pain and redness.    Respiratory: Negative for cough, chest tightness, shortness of breath and wheezing. Cardiovascular: Negative for chest pain, palpitations and leg swelling. Gastrointestinal: Negative for abdominal distention, abdominal pain, blood in stool, constipation, diarrhea, nausea and vomiting. Endocrine: Negative for cold intolerance, heat intolerance and polydipsia. Genitourinary: Negative for decreased urine volume, dysuria, flank pain, frequency, hematuria and urgency. Musculoskeletal: Negative for arthralgias, back pain, joint swelling, neck pain and neck stiffness. Skin: Negative for color change and rash. Neurological: Negative for dizziness, weakness, numbness and headaches. Hematological: Negative for adenopathy. Psychiatric/Behavioral: Negative for behavioral problems, sleep disturbance and suicidal ideas. The patient is not nervous/anxious. BP (!) 132/90 (Site: Left Upper Arm, Position: Sitting, Cuff Size: Large Adult)   Pulse 72   Ht 5' 10\" (1.778 m)   Wt 198 lb (89.8 kg)   SpO2 97%   BMI 28.41 kg/m²    Physical Exam  Constitutional:       General: She is not in acute distress. Appearance: Normal appearance. She is not ill-appearing. HENT:      Head: Normocephalic and atraumatic. Right Ear: Tympanic membrane, ear canal and external ear normal. There is no impacted cerumen. Left Ear: Tympanic membrane, ear canal and external ear normal. There is no impacted cerumen. Mouth/Throat:      Mouth: Mucous membranes are moist.      Pharynx: No oropharyngeal exudate or posterior oropharyngeal erythema. Eyes:      Extraocular Movements: Extraocular movements intact. Conjunctiva/sclera: Conjunctivae normal.      Pupils: Pupils are equal, round, and reactive to light. Neck:      Vascular: No carotid bruit. Cardiovascular:      Rate and Rhythm: Normal rate and regular rhythm. Pulses: Normal pulses. Heart sounds: Normal heart sounds. No murmur heard. No gallop.     Pulmonary:      Effort: Pulmonary effort is normal. Breath sounds: Normal breath sounds. No wheezing, rhonchi or rales. Abdominal:      General: Abdomen is flat. Bowel sounds are normal. There is no distension. Palpations: Abdomen is soft. Tenderness: There is no abdominal tenderness. There is no guarding or rebound. Musculoskeletal:         General: No swelling or tenderness. Normal range of motion. Cervical back: No tenderness. Lymphadenopathy:      Cervical: No cervical adenopathy. Skin:     Findings: No erythema, lesion or rash. Neurological:      General: No focal deficit present. Mental Status: She is alert and oriented to person, place, and time. Mental status is at baseline. Cranial Nerves: No cranial nerve deficit. Motor: No weakness. Psychiatric:         Mood and Affect: Mood normal.         Behavior: Behavior normal.         Thought Content: Thought content normal.         Judgment: Judgment normal.         ASSESSMENT/PLAN:  1. Mild intermittent asthma without complication  Assessment & Plan:  Stable and controlled on Symbicort 160-4.5 mcg 2 puffs twice daily, albuterol 4 times daily as needed  2. History of pneumothorax  Assessment & Plan:  Stable    Patient has had 2 episodes of spontaneous pneumothorax. follows with pulmonologist  3. Elevated BP without diagnosis of hypertension  Assessment & Plan:  /90  Patient may have untreated SRINIVASA  Patient will like to treat elevated blood pressure and SRINIVASA with weight loss  To verify blood pressure cuff accuracy  To keep outpatient BP log,  counseled on exercise and diet (including DASH diet)  Goal to achieve appropriate BMI  Patient agreed with plan with verbal understanding     4. DDD (degenerative disc disease), cervical  Assessment & Plan:   Stable and controlled patient at her baseline  5.  Overweight with body mass index (BMI) of 28 to 28.9 in adult  Assessment & Plan:   [facility]  Orders:  -     Amb Referral to

## 2022-06-08 DIAGNOSIS — R73.09 IMPAIRED GLUCOSE REGULATION: ICD-10-CM

## 2022-06-08 DIAGNOSIS — Z13.21 ENCOUNTER FOR VITAMIN DEFICIENCY SCREENING: ICD-10-CM

## 2022-06-08 DIAGNOSIS — R53.83 OTHER FATIGUE: ICD-10-CM

## 2022-06-08 DIAGNOSIS — Z11.59 ENCOUNTER FOR HEPATITIS C SCREENING TEST FOR LOW RISK PATIENT: ICD-10-CM

## 2022-06-08 DIAGNOSIS — I10 HTN (HYPERTENSION), BENIGN: ICD-10-CM

## 2022-06-08 DIAGNOSIS — Z11.4 ENCOUNTER FOR SCREENING FOR HIV: ICD-10-CM

## 2022-06-08 DIAGNOSIS — J45.20 MILD INTERMITTENT ASTHMA WITHOUT COMPLICATION: Primary | ICD-10-CM

## 2022-06-08 DIAGNOSIS — E55.9 VITAMIN D DEFICIENCY: ICD-10-CM

## 2022-06-08 DIAGNOSIS — E78.2 MIXED HYPERLIPIDEMIA: ICD-10-CM

## 2022-06-08 DIAGNOSIS — Z00.00 ANNUAL PHYSICAL EXAM: ICD-10-CM

## 2022-06-09 ENCOUNTER — TELEPHONE (OUTPATIENT)
Dept: PRIMARY CARE CLINIC | Age: 47
End: 2022-06-09

## 2022-06-09 DIAGNOSIS — I10 HTN (HYPERTENSION), BENIGN: ICD-10-CM

## 2022-06-09 DIAGNOSIS — R53.83 OTHER FATIGUE: ICD-10-CM

## 2022-06-09 DIAGNOSIS — E55.9 VITAMIN D DEFICIENCY: ICD-10-CM

## 2022-06-09 DIAGNOSIS — E78.2 MIXED HYPERLIPIDEMIA: ICD-10-CM

## 2022-06-09 DIAGNOSIS — Z00.00 ANNUAL PHYSICAL EXAM: ICD-10-CM

## 2022-06-09 DIAGNOSIS — R73.09 IMPAIRED GLUCOSE REGULATION: ICD-10-CM

## 2022-06-09 DIAGNOSIS — Z13.21 ENCOUNTER FOR VITAMIN DEFICIENCY SCREENING: ICD-10-CM

## 2022-06-09 LAB
A/G RATIO: 1.6 (ref 1.1–2.2)
ALBUMIN SERPL-MCNC: 4.5 G/DL (ref 3.4–5)
ALP BLD-CCNC: 52 U/L (ref 40–129)
ALT SERPL-CCNC: 13 U/L (ref 10–40)
ANION GAP SERPL CALCULATED.3IONS-SCNC: 13 MMOL/L (ref 3–16)
AST SERPL-CCNC: 20 U/L (ref 15–37)
BASOPHILS ABSOLUTE: 0 K/UL (ref 0–0.2)
BASOPHILS RELATIVE PERCENT: 0.4 %
BILIRUB SERPL-MCNC: 0.5 MG/DL (ref 0–1)
BUN BLDV-MCNC: 10 MG/DL (ref 7–20)
CALCIUM SERPL-MCNC: 9.8 MG/DL (ref 8.3–10.6)
CHLORIDE BLD-SCNC: 101 MMOL/L (ref 99–110)
CHOLESTEROL, TOTAL: 236 MG/DL (ref 0–199)
CO2: 24 MMOL/L (ref 21–32)
CREAT SERPL-MCNC: 0.8 MG/DL (ref 0.6–1.1)
EOSINOPHILS ABSOLUTE: 0.2 K/UL (ref 0–0.6)
EOSINOPHILS RELATIVE PERCENT: 3.5 %
FOLATE: 14.93 NG/ML (ref 4.78–24.2)
GFR AFRICAN AMERICAN: >60
GFR NON-AFRICAN AMERICAN: >60
GLUCOSE BLD-MCNC: 89 MG/DL (ref 70–99)
HCT VFR BLD CALC: 41.7 % (ref 36–48)
HDLC SERPL-MCNC: 80 MG/DL (ref 40–60)
HEMOGLOBIN: 14 G/DL (ref 12–16)
LDL CHOLESTEROL CALCULATED: 147 MG/DL
LYMPHOCYTES ABSOLUTE: 1.1 K/UL (ref 1–5.1)
LYMPHOCYTES RELATIVE PERCENT: 19.6 %
MAGNESIUM: 1.7 MG/DL (ref 1.8–2.4)
MCH RBC QN AUTO: 30.2 PG (ref 26–34)
MCHC RBC AUTO-ENTMCNC: 33.6 G/DL (ref 31–36)
MCV RBC AUTO: 89.7 FL (ref 80–100)
MONOCYTES ABSOLUTE: 0.5 K/UL (ref 0–1.3)
MONOCYTES RELATIVE PERCENT: 8.7 %
NEUTROPHILS ABSOLUTE: 3.8 K/UL (ref 1.7–7.7)
NEUTROPHILS RELATIVE PERCENT: 67.8 %
PDW BLD-RTO: 13.6 % (ref 12.4–15.4)
PLATELET # BLD: 260 K/UL (ref 135–450)
PMV BLD AUTO: 8.2 FL (ref 5–10.5)
POTASSIUM SERPL-SCNC: 4.3 MMOL/L (ref 3.5–5.1)
RBC # BLD: 4.65 M/UL (ref 4–5.2)
SODIUM BLD-SCNC: 138 MMOL/L (ref 136–145)
TOTAL PROTEIN: 7.4 G/DL (ref 6.4–8.2)
TRIGL SERPL-MCNC: 47 MG/DL (ref 0–150)
TSH REFLEX: 1.47 UIU/ML (ref 0.27–4.2)
VITAMIN B-12: 560 PG/ML (ref 211–911)
VITAMIN D 25-HYDROXY: 22.7 NG/ML
VLDLC SERPL CALC-MCNC: 9 MG/DL
WBC # BLD: 5.6 K/UL (ref 4–11)

## 2022-06-10 LAB
ESTIMATED AVERAGE GLUCOSE: 116.9 MG/DL
HBA1C MFR BLD: 5.7 %

## 2022-06-10 NOTE — RESULT ENCOUNTER NOTE
Please let patient know that their LDL is 147. Her LDL should be under 100. HDL 80 good cholesterol. Studies are showing HDL is above the increased risk for cardiac disease. Patient should be able to treat LDL and HDL  with diet an exercise/ but he/she has the option of starting cholesterol medication to reduce risk for heart attack and stroke. If patient  agrees I'll go ahead and send statin prescription. Patient Vit D is low at  22.7 they could get OTC Vit D 2000 U daily. Advised to take with largest meal since it is fat-soluble. Please let patient know that high doses of VIt D may increase their risk for developing A Fib.      Magnesium 1.7 which could be monitored. remaining results are within normal limits/negative.  Patient has 1 lab results which is pending if they don't hear from us, that means the result was normal

## 2022-06-10 NOTE — RESULT ENCOUNTER NOTE
Please let patient know that her A1C is 5.7 which is indicative of pre-diabetes. If she  likes I can refer to a Diabetic counsellor for nutritional counseling  but she should be able to improve her A1C number with diet and exercise. Normal is 5.6 and below.

## 2022-06-20 ENCOUNTER — NURSE ONLY (OUTPATIENT)
Dept: PRIMARY CARE CLINIC | Age: 47
End: 2022-06-20

## 2022-06-20 VITALS — HEART RATE: 74 BPM | DIASTOLIC BLOOD PRESSURE: 84 MMHG | SYSTOLIC BLOOD PRESSURE: 138 MMHG

## 2022-06-22 RX ORDER — HYDROCHLOROTHIAZIDE 12.5 MG/1
12.5 CAPSULE, GELATIN COATED ORAL EVERY MORNING
Qty: 90 CAPSULE | Refills: 1 | Status: CANCELLED | OUTPATIENT
Start: 2022-06-22

## 2022-06-23 DIAGNOSIS — I10 HTN (HYPERTENSION), BENIGN: Primary | ICD-10-CM

## 2022-06-23 RX ORDER — HYDROCHLOROTHIAZIDE 12.5 MG/1
12.5 CAPSULE, GELATIN COATED ORAL EVERY MORNING
Qty: 90 CAPSULE | Refills: 1 | Status: SHIPPED | OUTPATIENT
Start: 2022-06-23

## 2022-07-05 NOTE — PROGRESS NOTES
[address]  7/11/22  Referring: Dr. Jennifer Schmitt CONSULT/CHIEF COMPLAINT/HPI     Reason for visit/ Chief complaint   New patient  Hypertension, heart racing   HPI Robby Swenson is a 52 y.o. female being seen as a new patient today for hypertension and heart racing. She has a past medical history of asthma and spontaneous pneumothorax X2 requiring chest tube. Both parents have history of high BP. Two siblings have had strokes at a very young age (19's and 29's)    She was recently started on HCTZ 12.5 mg daily by her PCP for blood pressure control. Since having COVID in January she feels heart palpitations. During these times her watch won't  her heart rate. The palpitations come on while she is at rest. After having them she feels as if she has just ran a marathon. Denies dizziness or syncope. Has a brief stabbing chest pain on occasion. Has SOB with going up stairs. Her legs have recently started burning while walking. Burning goes away with rest. Occasional ankle swelling. Patient is adherent with medications and is tolerating them well without side effects     HISTORY/ALLERGIES/ROS     MedHx:  has a past medical history of Allergic rhinitis, Collapse of lung, Collapsed lung, Endometriosis, HTN (hypertension), benign, and Pneumothorax. SurgHx:  has a past surgical history that includes laparoscopy (1998) and chest tube insertion (Right). SocHx:  reports that she has never smoked. She has never used smokeless tobacco. She reports current alcohol use. She reports that she does not use drugs. FamHx: No family history of premature coronary artery disease, sudden death, or aneurysm. 2 siblings had strokes in their 25s or 35s. Allergies: Patient has no known allergies. MEDICATIONS      Prior to Admission medications    Medication Sig Start Date End Date Taking?  Authorizing Provider   hydroCHLOROthiazide (MICROZIDE) 12.5 MG capsule Take 1 capsule by mouth every morning 6/23/22  Yes Eva Bell MD   naproxen (NAPROSYN) 500 MG tablet TAKE 1 TABLET BY MOUTH TWICE DAILY WITH MEALS 7/29/21  Yes PADMAJA Howell CNP   albuterol sulfate HFA (VENTOLIN HFA) 108 (90 Base) MCG/ACT inhaler Inhale 2 puffs into the lungs every 6 hours as needed for Wheezing 7/29/21  Yes PADMAJA Howell CNP   budesonide-formoterol (SYMBICORT) 160-4.5 MCG/ACT AERO Inhale 2 puffs into the lungs 2 times daily 7/29/21  Yes PADMAJA Howell CNP   methocarbamol (ROBAXIN) 750 MG tablet Take 1 tablet by mouth 3 times daily 9/17/20  Yes PADMAJA Howell CNP   Qtjvbb-JvNppl-Ozsrp-FA-Omega 3 (DUET DHA) 25-1 & 400 MG MISC Take 1 tablet by mouth 12/9/16  Yes Historical Provider, MD       PHYSICAL EXAM        Vitals:    07/11/22 1046   BP: 124/82   Pulse:     Weight: 199 lb (90.3 kg)     Gen Alert, cooperative, no distress Heart  Regular rate and rhythm, no murmur   Head Normocephalic, atraumatic, no abnormalities Abd  Soft, NT, +BS, no mass, no OM   Eyes PERRLA, conj/corn clear Ext  Ext nl, AT, no C/C, no edema   Nose Nares normal, no drain age, Non-tender Pulse 2+ and symmetric   Throat Lips, mucosa, tongue normal Skin Color/text/turg nl, no rash/lesions   Neck S/S, TM, NT, no bruit Psych Nl mood and affect   Lung CTA-B, unlabored, no DTP     Ch wall NT, no deform       LABS and Imaging     Relevant and available CV data reviewed    EKG personally interpreted: 7/11/22 Sinus  Rhythm   Low voltage -possible pulmonary disease. Lipid profile 6/9/22 , HDL 80, , Tg 47        Echo 3/6/17  Findings    Left Ventricle   Left ventricular size is normal . Normal left ventricular wall thickness. Global ejection fraction is normal and estimated 60 %. No regional wall   motion abnormalities are noted. Normal diastolic function. E/e'= 7.7    Mitral Valve   The mitral valve is normal in structure and function. There is no   significant mitral regurgitation.     Left Atrium   The Sex: Female      Is Non- : Yes      Diabetic: No      Tobacco smoker: No      Systolic Blood Pressure: 676 mmHg      Is BP treated: Yes      HDL Cholesterol: 80 mg/dL      Total Cholesterol: 236 mg/dL          Patient counseled on lifestyle modification, diet, and exercise. Follow Up:  As needed if monitor and echo look okay    Dr. Grace Hidalgo MD  Cardiologist [address]: This note was scribed in the presence of Dr. Juan Aldana MD by Cookie Lopes RN. Physician Attestation  The scribe wrote this note in the presence of me Grace Hidalgo MD). The scribe may have prepopulated components of this note with my historical  intellectual property under my direct supervision. Any additions to this intellectual property were performed in my presence and at my direction. Furthermore, the content and accuracy of this note have been reviewed by me with edits by me as needed.   Grace Hidalgo MD 7/11/2022 11:17 AM

## 2022-07-11 ENCOUNTER — OFFICE VISIT (OUTPATIENT)
Dept: CARDIOLOGY CLINIC | Age: 47
End: 2022-07-11
Payer: COMMERCIAL

## 2022-07-11 VITALS
DIASTOLIC BLOOD PRESSURE: 82 MMHG | BODY MASS INDEX: 28.49 KG/M2 | HEART RATE: 73 BPM | SYSTOLIC BLOOD PRESSURE: 124 MMHG | WEIGHT: 199 LBS | HEIGHT: 70 IN

## 2022-07-11 DIAGNOSIS — Z86.16 HISTORY OF COVID-19: ICD-10-CM

## 2022-07-11 DIAGNOSIS — I10 HTN (HYPERTENSION), BENIGN: Primary | ICD-10-CM

## 2022-07-11 DIAGNOSIS — E78.2 MIXED HYPERLIPIDEMIA: ICD-10-CM

## 2022-07-11 DIAGNOSIS — R00.0 RACING HEART BEAT: ICD-10-CM

## 2022-07-11 PROCEDURE — 93000 ELECTROCARDIOGRAM COMPLETE: CPT | Performed by: INTERNAL MEDICINE

## 2022-07-11 PROCEDURE — 99244 OFF/OP CNSLTJ NEW/EST MOD 40: CPT | Performed by: INTERNAL MEDICINE

## 2022-07-25 ENCOUNTER — OFFICE VISIT (OUTPATIENT)
Dept: BARIATRICS/WEIGHT MGMT | Age: 47
End: 2022-07-25

## 2022-07-25 VITALS — HEIGHT: 70 IN | WEIGHT: 198.8 LBS | BODY MASS INDEX: 28.46 KG/M2

## 2022-07-25 DIAGNOSIS — E66.3 OVERWEIGHT (BMI 25.0-29.9): Primary | ICD-10-CM

## 2022-07-25 PROCEDURE — 99999 PR OFFICE/OUTPT VISIT,PROCEDURE ONLY: CPT | Performed by: FAMILY MEDICINE

## 2022-07-25 NOTE — PROGRESS NOTES
Peggy Smallwood is a 52 y.o. female with a date of birth of 1975. Vitals:    07/25/22 0851   Weight: 198 lb 12.8 oz (90.2 kg)   Height: 5' 10\" (1.778 m)    BMI: Body mass index is 28.52 kg/m². Obesity Classification: overweight    Weight History: Wt Readings from Last 3 Encounters:   07/11/22 199 lb (90.3 kg)   06/01/22 198 lb (89.8 kg)   07/29/21 202 lb (91.6 kg)       Patient's lowest adult weight was 150 lb at age 23. Patient's highest adult weight was 200 lb at age 40. Patient has participated in the following weight loss programs: Atkins Diet, nutritional counseling with a dietitian and low fat diet, low carb diet and calorie restriction. .   Patient has not participated in meal replacement/liquid diets. Patient has participated in weight loss medications.-Adipex in 2019    Patient is not lactose intolerant. Patient does not have Gnosticist/cultural food concerns. Patient does not have food allergies. 24 hour recall/food frequency chart:  Breakfast: kodiak birthday cake waffle with mccafe kcup coffee and premier protein halina with 3 slices of turkey shaffer  Snack: kodiak granola bar  Lunch: healthy choice greek chicken protein bowl  Snack: butter popcorn  Dinner: dirty rice with 3 ounces of ground beef  Snack: sometimes; protein bar or coffee   Drinks throughout the day: water-3 bottles, juice on occasion, 1 cup of coffee  Do you drink alcohol? yes. How often/how much alcohol do you drink: 1 Mixed drinks per week or less. Patient does not meet the criteria for binge eating disorder. Patient does not have grazing. Patient does not have night eating. Patient does not have a history of emotional eating or eating out of boredom. Goals  Weight: 170 lbs.   Health Improvement: lower blood pressure, lower cholesterol, wants to stop snoring         Assessment  Nutritional Needs:RMR=(9.99 x 90.2 kg) + (6.25 x 177.8cm) - (4.92 x 47 y.o.) -161  = 1620 kcal x 1.4 (light activity factor)= 2268 kcal - 1000 (for 2 lb weight loss/week)= 1269 kcal.    Plan  Start 1200 Kcal LC meal plan  Plan/Recommendations: General weight loss/lifestyle modification strategies discussed (elicit support from others; identify saboteurs; non-food rewards, etc). Diet interventions: low calorie (1000 kCal/d) deficit diet. Regular aerobic exercise program discussed. Optifast:  not interested in  Diet Medications:  Pt interested in     PES Statement:  Overweight/Obesity related to increased caloric intake and decreased physical activity as evidenced by BMI. Body mass index is 28.52 kg/m². Will follow up as necessary.     Henok Sexton, RD, LD

## 2022-07-26 PROCEDURE — 93228 REMOTE 30 DAY ECG REV/REPORT: CPT | Performed by: INTERNAL MEDICINE

## 2022-07-27 ENCOUNTER — TELEMEDICINE (OUTPATIENT)
Dept: BARIATRICS/WEIGHT MGMT | Age: 47
End: 2022-07-27
Payer: COMMERCIAL

## 2022-07-27 DIAGNOSIS — Z71.3 DIETARY COUNSELING AND SURVEILLANCE: ICD-10-CM

## 2022-07-27 DIAGNOSIS — E78.49 OTHER HYPERLIPIDEMIA: ICD-10-CM

## 2022-07-27 DIAGNOSIS — I10 HTN (HYPERTENSION), BENIGN: ICD-10-CM

## 2022-07-27 DIAGNOSIS — E66.3 OVERWEIGHT (BMI 25.0-29.9): Primary | ICD-10-CM

## 2022-07-27 PROCEDURE — 99204 OFFICE O/P NEW MOD 45 MIN: CPT | Performed by: FAMILY MEDICINE

## 2022-07-27 RX ORDER — NALTREXONE HYDROCHLORIDE AND BUPROPION HYDROCHLORIDE 8; 90 MG/1; MG/1
TABLET, EXTENDED RELEASE ORAL
Qty: 120 TABLET | Refills: 0 | Status: SHIPPED | OUTPATIENT
Start: 2022-07-27 | End: 2022-08-25

## 2022-07-27 ASSESSMENT — ENCOUNTER SYMPTOMS
BLOOD IN STOOL: 0
SHORTNESS OF BREATH: 0
WHEEZING: 0
PHOTOPHOBIA: 0
CHEST TIGHTNESS: 0
CONSTIPATION: 0
ABDOMINAL PAIN: 0
VOMITING: 0
NAUSEA: 0
EYE PAIN: 0
APNEA: 0
COUGH: 0
DIARRHEA: 0
CHOKING: 0
ABDOMINAL DISTENTION: 0

## 2022-07-27 NOTE — PROGRESS NOTES
Patient: Dnenis Norman     Encounter Date: 7/27/2022    YOB: 1975               Age: 52 y.o. Patient identification was verified at the start of the visit. Patient-Reported Vitals 7/26/2022   Patient-Reported Weight 198.4   Patient-Reported Height 510   Patient-Reported Systolic 779   Patient-Reported Diastolic 91   Patient-Reported Pulse 79   Patient-Reported Temperature 97.8   Patient-Reported SpO2 97         BP Readings from Last 1 Encounters:   07/11/22 124/82       BMI Readings from Last 1 Encounters:   07/25/22 28.52 kg/m²       Pulse Readings from Last 1 Encounters:   07/11/22 73                                          Wt Readings from Last 3 Encounters:   07/25/22 198 lb 12.8 oz (90.2 kg)   07/11/22 199 lb (90.3 kg)   06/01/22 198 lb (89.8 kg)        Chief Complaint   Patient presents with    Bariatric, Initial Visit     MWM- NP           HPI:    52 y.o. female presents via video visit for weight management. The patient has a long-standing history of weight gain which started gradually. The problem is mild. The patient has been gaining weight. Risk factors include annual weight gain of >2 lbs (1 kg)/ year and sedentary lifestyle. Aggravating factors include poor diet and lack of physical activity. The patient has tried various diet/exercise plans which have been ineffective in the long-run. she is motivated to start losing weight to help improve her health. When did you become overweight? [] Childhood   [] Teens   [x] Adulthood   [] Pregnancy   [] Menopause    Highest adult weight: 200 pounds at age 40    Triggers for weight gain? [] Stress   [] Illness   [] Medications   [] Travel  []Injury     [] Nightshift work   [] Insomnia  [x] No specific triggers   [] Other    Food triggers:   [] Stress   [] Boredom   [] Fast food   [x] Eating out   [] Seeking reward   [] Social     Have you ever taken prescription medications to help you lose weight?    [x] Yes- Adipex 2019   [] No    Have you ever been on a meal replacement program?  [] Yes  [x] No        Do you have sleep apnea? [x] Yes  [] No   [] Unknown         The patient denies any significant cardiac or psychiatric disease. The patient denies a history thyroid disease. The patient denies a history of glaucoma. The patient denies a history of nephrolithiasis. The patient denies a history of seizure disorders/epiliepsy. Dietitian's assessment reviewed and addressed with patient. Reviewed:  [x] Nutrition and the importance of regular protein intake  [x] Hidden CHO/carbohydrate sources  [x] Alcohol use  [x] Tobacco use  [x] Drug use- Denies   [x] Importance of exercise and reducing sedentary time        Controlled Substance Monitoring:     No flowsheet data found. No Known Allergies      Current Outpatient Medications:     naltrexone-buPROPion (CONTRAVE) 8-90 MG per extended release tablet, Start:1 tab po qam, then 1 tab po bid x 1 wk, then 2 tabs po qam and 1 tab po qpm x 1 wk;  Max 4 tabs/day, Disp: 120 tablet, Rfl: 0    hydroCHLOROthiazide (MICROZIDE) 12.5 MG capsule, Take 1 capsule by mouth every morning, Disp: 90 capsule, Rfl: 1    naproxen (NAPROSYN) 500 MG tablet, TAKE 1 TABLET BY MOUTH TWICE DAILY WITH MEALS, Disp: 60 tablet, Rfl: 5    albuterol sulfate HFA (VENTOLIN HFA) 108 (90 Base) MCG/ACT inhaler, Inhale 2 puffs into the lungs every 6 hours as needed for Wheezing, Disp: 1 Inhaler, Rfl: 5    budesonide-formoterol (SYMBICORT) 160-4.5 MCG/ACT AERO, Inhale 2 puffs into the lungs 2 times daily, Disp: 1 Inhaler, Rfl: 5    methocarbamol (ROBAXIN) 750 MG tablet, Take 1 tablet by mouth 3 times daily, Disp: 90 tablet, Rfl: 1    Ptkpwu-TvQntz-Dxgqk-FA-Omega 3 (DUET DHA) 25-1 & 400 MG MISC, Take 1 tablet by mouth, Disp: , Rfl:     Patient Active Problem List   Diagnosis    Endometriosis    Chest pain    Pneumothorax    Asthma    Vitamin D deficiency    Bronchitis    Mild intermittent asthma without complication History of pneumothorax    HTN (hypertension), benign    Menorrhagia with regular cycle    Intramural, submucous, and subserous leiomyoma of uterus    Endometriosis of pelvic peritoneum    Snores    Chronic right-sided low back pain with right-sided sciatica    Hydronephrosis    Adult BMI 28.0-28.9 kg/sq m    Chronic fatigue    Pleurisy    DDD (degenerative disc disease), cervical    Elevated BP without diagnosis of hypertension    Overweight with body mass index (BMI) of 28 to 28.9 in adult    SRINIVASA (obstructive sleep apnea)    Mixed hyperlipidemia       Past Medical History:   Diagnosis Date    Allergic rhinitis     Collapse of lung     as a child    Collapsed lung     right side    Endometriosis     HTN (hypertension), benign 9/17/2020    Pneumothorax 2006 & 2008       Past Surgical History:   Procedure Laterality Date    CHEST TUBE INSERTION Right     2 times    LAPAROSCOPY  1998       Family History   Problem Relation Age of Onset    High Cholesterol Mother     Hypertension Mother     Diabetes Mother     High Blood Pressure Mother     Heart Disease Mother     High Cholesterol Father     Hypertension Father     Diabetes Father     High Blood Pressure Father     Heart Disease Father     Stroke Paternal Grandmother     Stroke Sister        Review of Systems   Constitutional:  Negative for fatigue. Eyes:  Negative for photophobia, pain and visual disturbance. Respiratory:  Negative for apnea, cough, choking, chest tightness, shortness of breath and wheezing. Cardiovascular:  Negative for chest pain, palpitations and leg swelling. Gastrointestinal:  Negative for abdominal distention, abdominal pain, blood in stool, constipation, diarrhea, nausea and vomiting. Endocrine: Negative for cold intolerance and heat intolerance. Musculoskeletal:  Negative for arthralgias and myalgias. Skin:  Negative for rash. Neurological:  Negative for dizziness, tremors, syncope, weakness, numbness and headaches. Psychiatric/Behavioral:  Negative for agitation, confusion, decreased concentration, dysphoric mood, hallucinations, sleep disturbance and suicidal ideas. The patient is not nervous/anxious and is not hyperactive. Physical Exam  Constitutional:       Appearance: She is well-developed. HENT:      Head: Normocephalic. Eyes:      Conjunctiva/sclera: Conjunctivae normal.   Abdominal:      General: Abdomen is protuberant. Musculoskeletal:         General: No swelling. Neurological:      Mental Status: She is alert and oriented to person, place, and time. Psychiatric:         Mood and Affect: Mood normal.         Behavior: Behavior normal.         Thought Content: Thought content normal.         Judgment: Judgment normal.       Orders Only on 06/09/2022   Component Date Value Ref Range Status    TSH 06/09/2022 1.47  0.27 - 4.20 uIU/mL Final    Vit D, 25-Hydroxy 06/09/2022 22.7 (A) >=30 ng/mL Final    Comment: <=20 ng/mL. ........... Greg Boo Deficient  21-29 ng/mL. ......... Greg Boo Insufficient  >=30 ng/mL. ........ Greg Boo Sufficient      Vitamin B-12 06/09/2022 560  211 - 911 pg/mL Final    Folate 06/09/2022 14.93  4.78 - 24.20 ng/mL Final    Comment: Effective 11-15-16 10:00am EST  Please note reference ranges have  changed for Folate.       Magnesium 06/09/2022 1.70 (A) 1.80 - 2.40 mg/dL Final    Cholesterol, Total 06/09/2022 236 (A) 0 - 199 mg/dL Final    Triglycerides 06/09/2022 47  0 - 150 mg/dL Final    HDL 06/09/2022 80 (A) 40 - 60 mg/dL Final    LDL Calculated 06/09/2022 147 (A) <100 mg/dL Final    VLDL Cholesterol Calculated 06/09/2022 9  Not Established mg/dL Final    Hemoglobin A1C 06/09/2022 5.7  See comment % Final    Comment: Comment:  Diagnosis of Diabetes: > or = 6.5%  Increased risk of diabetes (Prediabetes): 5.7-6.4%  Glycemic Control: Nonpregnant Adults: <7.0%                    Pregnant: <6.0%        eAG 06/09/2022 116.9  mg/dL Final    Sodium 06/09/2022 138  136 - 145 mmol/L Final    Potassium 06/09/2022 4.3 Absolute 06/09/2022 0.5  0.0 - 1.3 K/uL Final    Eosinophils Absolute 06/09/2022 0.2  0.0 - 0.6 K/uL Final    Basophils Absolute 06/09/2022 0.0  0.0 - 0.2 K/uL Final         Assessment and Plan:    1. Overweight (BMI 25.0-29. 9)  Heavily counseled on the importance of therapeutic lifestyle changes through diet and exercise. The patient understands that the goal of treatment is to reach and stay at a healthy weight. The initial treatment goal is to lose at least 5-10% of her body weight in 12 weeks. This will require changes in eating habits, increased physical activity, and behavior changes. Counseled on low carb/vincent diet. Patient handouts and education material provided and reviewed in detail with the patient. All questions answered. Encouraged patient to keep a food journal and to bring it to her next visit. Discussed available treatment options in addition to lifestyle changes including medications or OPTIFAST. She is interested in anti-obesity medications. Explained that medications are most effective as part of a comprehensive treatment plan that includes proper nutrition, physical activity, and behavior modification. The patient also understands that she will need close follow-ups every 2-4 weeks if she starts treatment. Discussed risks, benefits and alternatives of Contrave. Patient meets BMI criteria. she denies MAOI use within the past 14 days, is not on any opioids, and does not have a seizure disorder. Start Contrave 8/90 mg. Use as directed. F/u in 2 weeks before titrating up to 3 pills a day. Explained to patient that I will monitor her weight loss every 12 weeks. Goal is to lose at least 5% of her body weight. Counseled patient on proper use and potential side effects including nausea/vomiting, constipation, headache, dizziness, insomnia, xerostomia, diarrhea, anxiety, increased blood pressure, flushing, fatigue, tremors, irritability, rash and/or palpitations.     she understands that it is her responsibility to make sure that she does not run out of medications and to follow up to her appointments every 2-4 weeks as recommended. Heavily counseled on the importance of therapeutic lifestyle changes through diet and exercise. Patient is responsible for keeping her monthly appointments. Failure to comply with her monthly visits will result in discontinuing the Contrave. Patient advised to report any side effects. Start weight: 198 pounds  Goal: At least 9.5 pounds by 11/30/22    2. Dietary counseling and surveillance  1200-Lauro/low carb meal plan. 3. HTN (hypertension), benign  Stable on tx. Encouraged a 5-10% weight loss to help improve this. Meds per PCP. 4. Other hyperlipidemia  Reinforced low carb/lauro diet and exercise. Nutrition:  [] LCHF/Ketogenic [x] Low carb/low-calorie diet [] Low-calorie diet     []Maintenance        FITTE:   [x] Cardio [] Resistance/stength exercises   [x] ACSM recommendations (150 minutes/week)           Behavior:   [x] Motivational interviewing performed    [] Referral for counseling  [x] Discussed strategies to overcome habits/challenges for focus      [x] Stress management   [x] Stimulus control  [x] Sleep hygiene      No orders of the defined types were placed in this encounter. No follow-ups on file. Robby Swenson is a 52 y.o. female being evaluated by a Virtual Visit (video visit) encounter to address concerns as mentioned above. A caregiver was present when appropriate. Due to this being a TeleHealth encounter (During [facility]-88 public health emergency), evaluation of the following organ systems was limited: Vitals/Constitutional/EENT/Resp/CV/GI//MS/Neuro/Skin/Heme-Lymph-Imm.   Pursuant to the emergency declaration under the [address] waiver authority and the [employer] and [employer] Act, this Virtual Visit was conducted with patient's (and/or legal guardian's) consent, to reduce the patient's risk of exposure to COVID-19 and provide necessary medical care. The patient (and/or legal guardian) has also been advised to contact this office for worsening conditions or problems, and seek emergency medical treatment and/or call 911 if deemed necessary. Services were provided through a video synchronous discussion virtually to substitute for in-person clinic visit. Patient and provider were located at their individual homes. --Popeye Crisostomo MD on 7/27/2022 at 2:53 PM    An electronic signature was used to authenticate this note.      Greater than 50% of this 45 minute visit was used for  -Preparing to see the patient such as reviewing the pt records   Obtaining and/or reviewing separately obtained history   Performing a medically appropriate history    Counseling and educating the patient, family, and/or caregiver   Ordering prescirption medications, tests, or procedures   Documenting clinical information in the electronic or other health record

## 2022-07-28 ENCOUNTER — TELEPHONE (OUTPATIENT)
Dept: BARIATRICS/WEIGHT MGMT | Age: 47
End: 2022-07-28

## 2022-07-29 NOTE — TELEPHONE ENCOUNTER
Pt returned called, relayed message to pt. Pt stated that she did  from pharm and paid. But would like to use the [facility] mail order next month for a cheaper price.   Did let pt know that would be fine

## 2022-08-13 ENCOUNTER — TELEMEDICINE (OUTPATIENT)
Dept: BARIATRICS/WEIGHT MGMT | Age: 47
End: 2022-08-13
Payer: COMMERCIAL

## 2022-08-13 DIAGNOSIS — Z71.3 DIETARY COUNSELING AND SURVEILLANCE: ICD-10-CM

## 2022-08-13 DIAGNOSIS — E66.3 OVERWEIGHT (BMI 25.0-29.9): Primary | ICD-10-CM

## 2022-08-13 PROCEDURE — 99214 OFFICE O/P EST MOD 30 MIN: CPT | Performed by: FAMILY MEDICINE

## 2022-08-13 ASSESSMENT — ENCOUNTER SYMPTOMS
PHOTOPHOBIA: 0
CONSTIPATION: 0
VOMITING: 0
CHEST TIGHTNESS: 0
NAUSEA: 0
ABDOMINAL PAIN: 0
COUGH: 0
WHEEZING: 0
SHORTNESS OF BREATH: 0
EYE PAIN: 0
CHOKING: 0
DIARRHEA: 0
BLOOD IN STOOL: 0
APNEA: 0
ABDOMINAL DISTENTION: 0

## 2022-08-13 NOTE — PROGRESS NOTES
Patient: Freida Bucio                      Encounter Date: 8/13/2022    YOB: 1975                Age: 52 y.o. Chief Complaint   Patient presents with    Weight Management     F/u TOPHER- Contrave          Patient identification was verified at the start of the visit. Patient-Reported Vitals 8/13/2022   Patient-Reported Weight 198   Patient-Reported Height 510   Patient-Reported Systolic 007   Patient-Reported Diastolic 84   Patient-Reported Pulse 77   Patient-Reported Temperature 97   Patient-Reported SpO2 97         BP Readings from Last 1 Encounters:   07/11/22 124/82       BMI Readings from Last 1 Encounters:   07/25/22 28.52 kg/m²       Pulse Readings from Last 1 Encounters:   07/11/22 73          Wt Readings from Last 3 Encounters:   07/25/22 198 lb 12.8 oz (90.2 kg)   07/11/22 199 lb (90.3 kg)   06/01/22 198 lb (89.8 kg)        HPI: 52 y.o. female with a long-standing history of obesity presents today for virtual video follow-up. Her weight is stable from her last visit. Current treatment includes Contrave and low carb/vincent diet. She is up to 3 pills/day. Tolerating it well. Making better dietary choices. Motivated to continue losing weight. Medication(s): Appetite well controlled? []Yes      [x]No    Focus:     [x]Good     []Fair     []Poor    Side effects? Decreased energy in the evenings        Any recent change in medication(s)? No        No Known Allergies      Current Outpatient Medications:     naltrexone-buPROPion (CONTRAVE) 8-90 MG per extended release tablet, Start:1 tab po qam, then 1 tab po bid x 1 wk, then 2 tabs po qam and 1 tab po qpm x 1 wk;  Max 4 tabs/day, Disp: 120 tablet, Rfl: 0    hydroCHLOROthiazide (MICROZIDE) 12.5 MG capsule, Take 1 capsule by mouth every morning, Disp: 90 capsule, Rfl: 1    naproxen (NAPROSYN) 500 MG tablet, TAKE 1 TABLET BY MOUTH TWICE DAILY WITH MEALS, Disp: 60 tablet, Rfl: 5    albuterol sulfate HFA (VENTOLIN HFA) 108 (90 Base) MCG/ACT inhaler, Inhale 2 puffs into the lungs every 6 hours as needed for Wheezing, Disp: 1 Inhaler, Rfl: 5    budesonide-formoterol (SYMBICORT) 160-4.5 MCG/ACT AERO, Inhale 2 puffs into the lungs 2 times daily, Disp: 1 Inhaler, Rfl: 5    methocarbamol (ROBAXIN) 750 MG tablet, Take 1 tablet by mouth 3 times daily, Disp: 90 tablet, Rfl: 1    Rmycim-FhXfxk-Ezoih-FA-Omega 3 (DUET DHA) 25-1 & 400 MG MISC, Take 1 tablet by mouth, Disp: , Rfl:     Patient Active Problem List   Diagnosis    Endometriosis    Chest pain    Pneumothorax    Asthma    Vitamin D deficiency    Bronchitis    Mild intermittent asthma without complication    History of pneumothorax    HTN (hypertension), benign    Menorrhagia with regular cycle    Intramural, submucous, and subserous leiomyoma of uterus    Endometriosis of pelvic peritoneum    Snores    Chronic right-sided low back pain with right-sided sciatica    Hydronephrosis    Adult BMI 28.0-28.9 kg/sq m    Chronic fatigue    Pleurisy    DDD (degenerative disc disease), cervical    Elevated BP without diagnosis of hypertension    Overweight with body mass index (BMI) of 28 to 28.9 in adult    SRINIVASA (obstructive sleep apnea)    Mixed hyperlipidemia       Review of Systems   Constitutional:  Negative for fatigue. Eyes:  Negative for photophobia, pain and visual disturbance. Respiratory:  Negative for apnea, cough, choking, chest tightness, shortness of breath and wheezing. Cardiovascular:  Negative for chest pain, palpitations and leg swelling. Gastrointestinal:  Negative for abdominal distention, abdominal pain, blood in stool, constipation, diarrhea, nausea and vomiting. Endocrine: Negative for cold intolerance and heat intolerance. Musculoskeletal:  Negative for arthralgias and myalgias. Skin:  Negative for rash. Neurological:  Negative for dizziness, tremors, syncope, weakness, numbness and headaches.    Psychiatric/Behavioral:  Negative for agitation, confusion, decreased concentration, dysphoric mood, hallucinations, sleep disturbance and suicidal ideas. The patient is not nervous/anxious and is not hyperactive. Physical Exam  Constitutional:       Appearance: She is well-developed. HENT:      Head: Normocephalic. Eyes:      Conjunctiva/sclera: Conjunctivae normal.   Abdominal:      General: Abdomen is protuberant. Musculoskeletal:         General: No swelling. Neurological:      Mental Status: She is alert and oriented to person, place, and time. Psychiatric:         Mood and Affect: Mood normal.         Behavior: Behavior normal.         Thought Content: Thought content normal.         Judgment: Judgment normal.       Orders Only on 06/09/2022   Component Date Value Ref Range Status    TSH 06/09/2022 1.47  0.27 - 4.20 uIU/mL Final    Vit D, 25-Hydroxy 06/09/2022 22.7 (A) >=30 ng/mL Final    Comment: <=20 ng/mL. ........... Margurette Walter Deficient  21-29 ng/mL. ......... Margurette Walter Insufficient  >=30 ng/mL. ........ Margurette Walter Sufficient      Vitamin B-12 06/09/2022 560  211 - 911 pg/mL Final    Folate 06/09/2022 14.93  4.78 - 24.20 ng/mL Final    Comment: Effective 11-15-16 10:00am EST  Please note reference ranges have  changed for Folate.       Magnesium 06/09/2022 1.70 (A) 1.80 - 2.40 mg/dL Final    Cholesterol, Total 06/09/2022 236 (A) 0 - 199 mg/dL Final    Triglycerides 06/09/2022 47  0 - 150 mg/dL Final    HDL 06/09/2022 80 (A) 40 - 60 mg/dL Final    LDL Calculated 06/09/2022 147 (A) <100 mg/dL Final    VLDL Cholesterol Calculated 06/09/2022 9  Not Established mg/dL Final    Hemoglobin A1C 06/09/2022 5.7  See comment % Final    Comment: Comment:  Diagnosis of Diabetes: > or = 6.5%  Increased risk of diabetes (Prediabetes): 5.7-6.4%  Glycemic Control: Nonpregnant Adults: <7.0%                    Pregnant: <6.0%        eAG 06/09/2022 116.9  mg/dL Final    Sodium 06/09/2022 138  136 - 145 mmol/L Final    Potassium 06/09/2022 4.3  3.5 - 5.1 mmol/L Final    Chloride 06/09/2022 101  99 - 110 mmol/L Final CO2 06/09/2022 24  21 - 32 mmol/L Final    Anion Gap 06/09/2022 13  3 - 16 Final    Glucose 06/09/2022 89  70 - 99 mg/dL Final    BUN 06/09/2022 10  7 - 20 mg/dL Final    Creatinine 06/09/2022 0.8  0.6 - 1.1 mg/dL Final    GFR Non- 06/09/2022 >60  >60 Final    Comment: >60 mL/min/1.73m2 EGFR, calc. for ages 25 and older using the  MDRD formula (not corrected for weight), is valid for stable  renal function. GFR  06/09/2022 >60  >60 Final    Comment: Chronic Kidney Disease: less than 60 ml/min/1.73 sq.m. Kidney Failure: less than 15 ml/min/1.73 sq.m. Results valid for patients 18 years and older.       Calcium 06/09/2022 9.8  8.3 - 10.6 mg/dL Final    Total Protein 06/09/2022 7.4  6.4 - 8.2 g/dL Final    Albumin 06/09/2022 4.5  3.4 - 5.0 g/dL Final    Albumin/Globulin Ratio 06/09/2022 1.6  1.1 - 2.2 Final    Total Bilirubin 06/09/2022 0.5  0.0 - 1.0 mg/dL Final    Alkaline Phosphatase 06/09/2022 52  40 - 129 U/L Final    ALT 06/09/2022 13  10 - 40 U/L Final    AST 06/09/2022 20  15 - 37 U/L Final    WBC 06/09/2022 5.6  4.0 - 11.0 K/uL Final    RBC 06/09/2022 4.65  4.00 - 5.20 M/uL Final    Hemoglobin 06/09/2022 14.0  12.0 - 16.0 g/dL Final    Hematocrit 06/09/2022 41.7  36.0 - 48.0 % Final    MCV 06/09/2022 89.7  80.0 - 100.0 fL Final    MCH 06/09/2022 30.2  26.0 - 34.0 pg Final    MCHC 06/09/2022 33.6  31.0 - 36.0 g/dL Final    RDW 06/09/2022 13.6  12.4 - 15.4 % Final    Platelets 50/45/5449 260  135 - 450 K/uL Final    MPV 06/09/2022 8.2  5.0 - 10.5 fL Final    Neutrophils % 06/09/2022 67.8  % Final    Lymphocytes % 06/09/2022 19.6  % Final    Monocytes % 06/09/2022 8.7  % Final    Eosinophils % 06/09/2022 3.5  % Final    Basophils % 06/09/2022 0.4  % Final    Neutrophils Absolute 06/09/2022 3.8  1.7 - 7.7 K/uL Final    Lymphocytes Absolute 06/09/2022 1.1  1.0 - 5.1 K/uL Final    Monocytes Absolute 06/09/2022 0.5  0.0 - 1.3 K/uL Final    Eosinophils Absolute 06/09/2022 0.2  0.0 - 0.6 K/uL Final    Basophils Absolute 06/09/2022 0.0  0.0 - 0.2 K/uL Final         Assessment and Plan:  1. Overweight (BMI 25.0-29. 9)  Stable, not at goal.   Continue Contrave- increase dose as prescribed. Ok to increase caloric intake to 1500 Lauro to see if that helps improve energy levels. F/u 2 weeks. Report any side effects. 2. Dietary counseling and surveillance  [id number]/low carb diet. Nutrition Plan: [] LCHF/Ketogenic   [x] Modified low-calorie diet (low carb/low-lauro)               [] Low-calorie diet    [] Maintenance       []Other        Exercise: [x] Cardio     [x] Resistance/strength training                       [x] ACSM recommendations (150 minutes/week in active weight loss)               Behavior: [x] Motivational interviewing performed    [] Referral for counseling                         [x] Discussed strategies to overcome habits/challenges for focus         [] Stress management   [x] Stimulus control         [] Sleep hygiene      Reviewed:  [x] Nutrition and the importance of regular protein intake  [x] Hidden carbohydrate sources  [x] Alcohol use  [x] Tobacco use   [x] Drug use- Denies  [x] Importance of exercise and reducing sedentary time  [x] Treatment consent- Patient understands and agrees with the treatment plan   [x] Proper use of medication and side effects            Start weight: 198 pounds  Goal: At least 9.5 pounds by 11/30/22  Total weight loss: 0 pounds       Key dietary points:    - Meats (preferably organic or grass fed) are great sources of protein and have no carbohydrates. - Recommend coconut, olive, avocado, or almond oils. - When buying dairy, choose regular or full fat options. - Choose vegetables that grow above ground as they are generally lower in carbohydrates and higher in fiber.  - Avoid starches such as bread, rice, potatoes, pasta and all sources of simple sugars (desserts, soda, breakfast cereals).   - Choose beverages that are calorie and sugar free. Reminder regarding weight loss medications: You must be seen in office every 2-4 weeks to haveyour prescriptions refilled. If you are off of your medication for longer than 7 days, you will not be able to restart the medication for at least 6 months. Always call our office to report any side effects. Females, it is your responsibility to obtain negative pregnancy tests each month. No orders of the defined types were placed in this encounter. No follow-ups on file. Grady Rasheed is a 52 y.o. female being evaluated by a Virtual Visit (video visit) encounter to address concerns as mentioned above. A caregiver was present when appropriate. Due to this being a TeleHealth encounter (During SKUWB-84 public health emergency), evaluation of the following organ systems was limited: Vitals/Constitutional/EENT/Resp/CV/GI//MS/Neuro/Skin/Heme-Lymph-Imm. Pursuant to the emergency declaration under the [address] [place] [address] authority and the [employer] and [employer] Act, this Virtual Visit was conducted with patient's (and/or legal guardian's) consent, to reduce the patient's risk of exposure to COVID-19 and provide necessary medical care. The patient (and/or legal guardian) has also been advised to contact this office for worsening conditions or problems, and seek emergency medical treatment and/or call 911 if deemed necessary.

## 2022-08-25 ENCOUNTER — TELEMEDICINE (OUTPATIENT)
Dept: BARIATRICS/WEIGHT MGMT | Age: 47
End: 2022-08-25
Payer: COMMERCIAL

## 2022-08-25 DIAGNOSIS — Z71.3 DIETARY COUNSELING AND SURVEILLANCE: ICD-10-CM

## 2022-08-25 DIAGNOSIS — E66.3 OVERWEIGHT (BMI 25.0-29.9): Primary | ICD-10-CM

## 2022-08-25 PROCEDURE — 99214 OFFICE O/P EST MOD 30 MIN: CPT | Performed by: FAMILY MEDICINE

## 2022-08-25 RX ORDER — NALTREXONE HYDROCHLORIDE AND BUPROPION HYDROCHLORIDE 8; 90 MG/1; MG/1
2 TABLET, EXTENDED RELEASE ORAL 2 TIMES DAILY
Qty: 120 TABLET | Refills: 0 | Status: SHIPPED | OUTPATIENT
Start: 2022-08-25 | End: 2022-09-22

## 2022-08-25 ASSESSMENT — ENCOUNTER SYMPTOMS
EYE PAIN: 0
ABDOMINAL DISTENTION: 0
WHEEZING: 0
APNEA: 0
CHOKING: 0
VOMITING: 0
COUGH: 0
ABDOMINAL PAIN: 0
BLOOD IN STOOL: 0
PHOTOPHOBIA: 0
CONSTIPATION: 0
SHORTNESS OF BREATH: 0
DIARRHEA: 0
CHEST TIGHTNESS: 0
NAUSEA: 0

## 2022-08-25 NOTE — PROGRESS NOTES
Patient: Judy Martinez                      Encounter Date: 8/25/2022    YOB: 1975                Age: 52 y.o. Chief Complaint   Patient presents with    Weight Management     F/u MWM         Patient identification was verified at the start of the visit. Patient-Reported Vitals 8/25/2022   Patient-Reported Weight 194.8   Patient-Reported Height 510   Patient-Reported Systolic 659   Patient-Reported Diastolic 90   Patient-Reported Pulse 84   Patient-Reported Temperature 98   Patient-Reported SpO2 97         BP Readings from Last 1 Encounters:   07/11/22 124/82       BMI Readings from Last 1 Encounters:   07/25/22 28.52 kg/m²       Pulse Readings from Last 1 Encounters:   07/11/22 73        HPI: 52 y.o. female with a long-standing history of obesity presents today for virtual video follow-up. She has lost 4 pounds since her last visit. Current treatment includes Contrave and low carb/vincent diet. She is up to 4 pills/day. Tolerating it well. Making good dietary choices. Medication(s): Appetite well controlled? []Yes      [x]No                          Focus:     [x]Good     []Fair     []Poor                          Side effects? No       Any recent change in medication(s)?  No        No Known Allergies      Current Outpatient Medications:     naltrexone-buPROPion (CONTRAVE) 8-90 MG per extended release tablet, Take 2 tablets by mouth 2 times daily, Disp: 120 tablet, Rfl: 0    hydroCHLOROthiazide (MICROZIDE) 12.5 MG capsule, Take 1 capsule by mouth every morning, Disp: 90 capsule, Rfl: 1    naproxen (NAPROSYN) 500 MG tablet, TAKE 1 TABLET BY MOUTH TWICE DAILY WITH MEALS, Disp: 60 tablet, Rfl: 5    albuterol sulfate HFA (VENTOLIN HFA) 108 (90 Base) MCG/ACT inhaler, Inhale 2 puffs into the lungs every 6 hours as needed for Wheezing, Disp: 1 Inhaler, Rfl: 5    budesonide-formoterol (SYMBICORT) 160-4.5 MCG/ACT AERO, Inhale 2 puffs into the lungs 2 times daily, Disp: 1 Inhaler, Rfl: 5 methocarbamol (ROBAXIN) 750 MG tablet, Take 1 tablet by mouth 3 times daily, Disp: 90 tablet, Rfl: 1    Zwrjck-KrKkqd-Iejhu-FA-Omega 3 (DUET DHA) 25-1 & 400 MG MISC, Take 1 tablet by mouth, Disp: , Rfl:     Patient Active Problem List   Diagnosis    Endometriosis    Chest pain    Pneumothorax    Asthma    Vitamin D deficiency    Bronchitis    Mild intermittent asthma without complication    History of pneumothorax    HTN (hypertension), benign    Menorrhagia with regular cycle    Intramural, submucous, and subserous leiomyoma of uterus    Endometriosis of pelvic peritoneum    Snores    Chronic right-sided low back pain with right-sided sciatica    Hydronephrosis    Adult BMI 28.0-28.9 kg/sq m    Chronic fatigue    Pleurisy    DDD (degenerative disc disease), cervical    Elevated BP without diagnosis of hypertension    Overweight with body mass index (BMI) of 28 to 28.9 in adult    SRINIVASA (obstructive sleep apnea)    Mixed hyperlipidemia       Review of Systems   Constitutional:  Negative for fatigue. Eyes:  Negative for photophobia, pain and visual disturbance. Respiratory:  Negative for apnea, cough, choking, chest tightness, shortness of breath and wheezing. Cardiovascular:  Negative for chest pain, palpitations and leg swelling. Gastrointestinal:  Negative for abdominal distention, abdominal pain, blood in stool, constipation, diarrhea, nausea and vomiting. Endocrine: Negative for cold intolerance and heat intolerance. Musculoskeletal:  Negative for arthralgias and myalgias. Skin:  Negative for rash. Neurological:  Negative for dizziness, tremors, syncope, weakness, numbness and headaches. Psychiatric/Behavioral:  Negative for agitation, confusion, decreased concentration, dysphoric mood, hallucinations, sleep disturbance and suicidal ideas. The patient is not nervous/anxious and is not hyperactive. Physical Exam  Constitutional:       Appearance: She is well-developed.    HENT:      Head: Normocephalic. Eyes:      Conjunctiva/sclera: Conjunctivae normal.   Abdominal:      General: Abdomen is protuberant. Musculoskeletal:         General: No swelling. Neurological:      Mental Status: She is alert and oriented to person, place, and time. Psychiatric:         Mood and Affect: Mood normal.         Behavior: Behavior normal.         Thought Content: Thought content normal.         Judgment: Judgment normal.       Orders Only on 06/09/2022   Component Date Value Ref Range Status    TSH 06/09/2022 1.47  0.27 - 4.20 uIU/mL Final    Vit D, 25-Hydroxy 06/09/2022 22.7 (A) >=30 ng/mL Final    Comment: <=20 ng/mL. ........... Juan R Amend Deficient  21-29 ng/mL. ......... Juan R Amend Insufficient  >=30 ng/mL. ........ Juan R Amend Sufficient      Vitamin B-12 06/09/2022 560  211 - 911 pg/mL Final    Folate 06/09/2022 14.93  4.78 - 24.20 ng/mL Final    Comment: Effective 11-15-16 10:00am EST  Please note reference ranges have  changed for Folate.       Magnesium 06/09/2022 1.70 (A) 1.80 - 2.40 mg/dL Final    Cholesterol, Total 06/09/2022 236 (A) 0 - 199 mg/dL Final    Triglycerides 06/09/2022 47  0 - 150 mg/dL Final    HDL 06/09/2022 80 (A) 40 - 60 mg/dL Final    LDL Calculated 06/09/2022 147 (A) <100 mg/dL Final    VLDL Cholesterol Calculated 06/09/2022 9  Not Established mg/dL Final    Hemoglobin A1C 06/09/2022 5.7  See comment % Final    Comment: Comment:  Diagnosis of Diabetes: > or = 6.5%  Increased risk of diabetes (Prediabetes): 5.7-6.4%  Glycemic Control: Nonpregnant Adults: <7.0%                    Pregnant: <6.0%        eAG 06/09/2022 116.9  mg/dL Final    Sodium 06/09/2022 138  136 - 145 mmol/L Final    Potassium 06/09/2022 4.3  3.5 - 5.1 mmol/L Final    Chloride 06/09/2022 101  99 - 110 mmol/L Final    CO2 06/09/2022 24  21 - 32 mmol/L Final    Anion Gap 06/09/2022 13  3 - 16 Final    Glucose 06/09/2022 89  70 - 99 mg/dL Final    BUN 06/09/2022 10  7 - 20 mg/dL Final    Creatinine 06/09/2022 0.8  0.6 - 1.1 mg/dL Final    GFR Non- 06/09/2022 >60  >60 Final    Comment: >60 mL/min/1.73m2 EGFR, calc. for ages 25 and older using the  MDRD formula (not corrected for weight), is valid for stable  renal function. GFR  06/09/2022 >60  >60 Final    Comment: Chronic Kidney Disease: less than 60 ml/min/1.73 sq.m. Kidney Failure: less than 15 ml/min/1.73 sq.m. Results valid for patients 18 years and older. Calcium 06/09/2022 9.8  8.3 - 10.6 mg/dL Final    Total Protein 06/09/2022 7.4  6.4 - 8.2 g/dL Final    Albumin 06/09/2022 4.5  3.4 - 5.0 g/dL Final    Albumin/Globulin Ratio 06/09/2022 1.6  1.1 - 2.2 Final    Total Bilirubin 06/09/2022 0.5  0.0 - 1.0 mg/dL Final    Alkaline Phosphatase 06/09/2022 52  40 - 129 U/L Final    ALT 06/09/2022 13  10 - 40 U/L Final    AST 06/09/2022 20  15 - 37 U/L Final    WBC 06/09/2022 5.6  4.0 - 11.0 K/uL Final    RBC 06/09/2022 4.65  4.00 - 5.20 M/uL Final    Hemoglobin 06/09/2022 14.0  12.0 - 16.0 g/dL Final    Hematocrit 06/09/2022 41.7  36.0 - 48.0 % Final    MCV 06/09/2022 89.7  80.0 - 100.0 fL Final    MCH 06/09/2022 30.2  26.0 - 34.0 pg Final    MCHC 06/09/2022 33.6  31.0 - 36.0 g/dL Final    RDW 06/09/2022 13.6  12.4 - 15.4 % Final    Platelets 61/51/9270 260  135 - 450 K/uL Final    MPV 06/09/2022 8.2  5.0 - 10.5 fL Final    Neutrophils % 06/09/2022 67.8  % Final    Lymphocytes % 06/09/2022 19.6  % Final    Monocytes % 06/09/2022 8.7  % Final    Eosinophils % 06/09/2022 3.5  % Final    Basophils % 06/09/2022 0.4  % Final    Neutrophils Absolute 06/09/2022 3.8  1.7 - 7.7 K/uL Final    Lymphocytes Absolute 06/09/2022 1.1  1.0 - 5.1 K/uL Final    Monocytes Absolute 06/09/2022 0.5  0.0 - 1.3 K/uL Final    Eosinophils Absolute 06/09/2022 0.2  0.0 - 0.6 K/uL Final    Basophils Absolute 06/09/2022 0.0  0.0 - 0.2 K/uL Final         Assessment and Plan:  1. Overweight (BMI 25.0-29. 9)  Improving, not at goal.  Continue Contrave, low carb/vincent diet.   Refill provided. Increase exercise. HP access provided to start exercise program.  F/u 4 weeks. 2. Dietary counseling and surveillance  1200-Lauro/low carb meal plan. Nutrition Plan: [] LCHF/Ketogenic   [x] Modified low-calorie diet (low carb/low-lauro)               [] Low-calorie diet    [] Maintenance       []Other        Exercise: [x] Cardio     [x] Resistance/strength training                       [x] ACSM recommendations (150 minutes/week in active weight loss)               Behavior: [x] Motivational interviewing performed    [] Referralfor counseling                         [x] Discussed strategies to overcome habits/challenges for focus         [] Stress management   [x] Stimulus control         [] Sleep hygiene      Reviewed:  [x] Nutrition and the importance of regular protein intake  [x] Hidden carbohydrate sources  [x] Alcohol use  [x] Tobacco use   [x] Drug use- Denies  [x] Importance of exercise and reducing sedentary time  [x] Treatment consent- Patient understands and agrees with the treatment plan   [x] Proper use of medication and side effects      Controlled Substance Monitoring:    No flowsheet data found. Patient denies any history of cardiovascular disease (e.g., CAD, stroke, arrhythmias, CHF, uncontrolled HTN), seizure disorder, MAOI use within the last 2 weeks, hyperthyroidism, glaucoma, agitated states, history of drug abuse, pregnancy, nursing, known hypersensitivity to the prescribing meds, history of pancreatitis, or personal or family history of thyroid medullary cancer. Start weight: 198 pounds  Goal: At least 9.5 pounds by 11/30/22  Total weight loss: 4 pounds     Key dietary points:    - Meats (preferably organic or grass fed) are great sources of protein and have no carbohydrates. - Recommend coconut, olive, avocado, or almond oils. - When buying dairy, choose regular or full fat options.   - Choose vegetables that grow above ground as they are generally lower in carbohydrates and higher in fiber.  - Avoid starches such as bread, rice, potatoes, pasta and all sources of simple sugars (desserts, soda, breakfast cereals). - Choose beverages that are calorie and sugar free. Reminder regarding weight loss medications: You must be seen in office every 2-4 weeks to haveyour prescriptions refilled. If you are off of your medication for longer than 7 days, you will not be able to restart the medication for at least 6 months. Always call our office to report any side effects. Females, it is your responsibility to obtain negative pregnancy tests each month. No orders of the defined types were placed in this encounter. No follow-ups on file. Jess Templeton is a 52 y.o. female being evaluated by a Virtual Visit (video visit) encounter to address concerns as mentioned above. A caregiver was present when appropriate. Due to this being a TeleHealth encounter (During [facility]- public health emergency), evaluation of the following organ systems was limited: Vitals/Constitutional/EENT/Resp/CV/GI//MS/Neuro/Skin/Heme-Lymph-Imm. Pursuant to the emergency declaration under the [address] [place] [address], [address] [place] [address] authority and the [employer] and [employer] Act, this Virtual Visit was conducted with patient's (and/or legal guardian's) consent, to reduce the patient's risk of exposure to COVID-19 and provide necessary medical care. The patient (and/or legal guardian) has also been advised to contact this office for worsening conditions or problems, and seek emergency medical treatment and/or call 911 if deemed necessary.

## 2022-09-19 ENCOUNTER — PROCEDURE VISIT (OUTPATIENT)
Dept: CARDIOLOGY CLINIC | Age: 47
End: 2022-09-19
Payer: COMMERCIAL

## 2022-09-19 DIAGNOSIS — Z86.16 HISTORY OF COVID-19: ICD-10-CM

## 2022-09-19 DIAGNOSIS — R00.0 RACING HEART BEAT: ICD-10-CM

## 2022-09-19 DIAGNOSIS — I10 HTN (HYPERTENSION), BENIGN: ICD-10-CM

## 2022-09-19 LAB
LV EF: 58 %
LVEF MODALITY: NORMAL

## 2022-09-19 PROCEDURE — 93306 TTE W/DOPPLER COMPLETE: CPT | Performed by: INTERNAL MEDICINE

## 2022-09-20 ENCOUNTER — TELEPHONE (OUTPATIENT)
Dept: CARDIOLOGY CLINIC | Age: 47
End: 2022-09-20

## 2022-09-20 NOTE — TELEPHONE ENCOUNTER
----- Message from Tasneem Cifuentes MD sent at 9/20/2022  9:19 AM EDT -----  Echo within normal limits  Please let patient know

## 2022-09-22 ENCOUNTER — TELEMEDICINE (OUTPATIENT)
Dept: BARIATRICS/WEIGHT MGMT | Age: 47
End: 2022-09-22
Payer: COMMERCIAL

## 2022-09-22 DIAGNOSIS — E66.3 OVERWEIGHT (BMI 25.0-29.9): Primary | ICD-10-CM

## 2022-09-22 DIAGNOSIS — Z71.3 DIETARY COUNSELING AND SURVEILLANCE: ICD-10-CM

## 2022-09-22 PROCEDURE — 99214 OFFICE O/P EST MOD 30 MIN: CPT | Performed by: FAMILY MEDICINE

## 2022-09-22 RX ORDER — NALTREXONE HYDROCHLORIDE AND BUPROPION HYDROCHLORIDE 8; 90 MG/1; MG/1
2 TABLET, EXTENDED RELEASE ORAL 2 TIMES DAILY
Qty: 120 TABLET | Refills: 0 | Status: SHIPPED | OUTPATIENT
Start: 2022-09-22 | End: 2022-10-19

## 2022-09-22 ASSESSMENT — ENCOUNTER SYMPTOMS
APNEA: 0
PHOTOPHOBIA: 0
COUGH: 0
VOMITING: 0
WHEEZING: 0
CONSTIPATION: 0
EYE PAIN: 0
SHORTNESS OF BREATH: 0
ABDOMINAL PAIN: 0
NAUSEA: 0
DIARRHEA: 0
CHEST TIGHTNESS: 0
ABDOMINAL DISTENTION: 0
BLOOD IN STOOL: 0
CHOKING: 0

## 2022-09-22 NOTE — PROGRESS NOTES
Patient: Ramsey Green                      Encounter Date: 9/22/2022    YOB: 1975                Age: 52 y.o. Chief Complaint   Patient presents with    Weight Management     F/u TOPHER- Contrave            Patient identification was verified at the start of the visit. Patient-Reported Vitals 9/21/2022   Patient-Reported Weight 189   Patient-Reported Height 510   Patient-Reported Systolic 122   Patient-Reported Diastolic 98   Patient-Reported Pulse 79   Patient-Reported Temperature 98.7   Patient-Reported SpO2 97         BP Readings from Last 1 Encounters:   07/11/22 124/82       BMI Readings from Last 1 Encounters:   07/25/22 28.52 kg/m²       Pulse Readings from Last 1 Encounters:   07/11/22 73          Wt Readings from Last 3 Encounters:   07/25/22 198 lb 12.8 oz (90.2 kg)   07/11/22 199 lb (90.3 kg)   06/01/22 198 lb (89.8 kg)      HPI: 52 y.o. female with a long-standing history of obesity presents today for virtual video follow-up. She has lost 5 pounds since her last visit. Current treatment includes Contrave and low carb/vincent diet. Tolerating it well. Exercising at least 60 minutes/week. Medication(s): Appetite well controlled? []Yes      [x]No                          Focus:     [x]Good     []Fair     []Poor                          Side effects? No       Any recent change in medication(s)?  No    Exercise: [x]Cardio     [x]Resistance/strength training     []Other:     No Known Allergies      Current Outpatient Medications:     naltrexone-buPROPion (CONTRAVE) 8-90 MG per extended release tablet, Take 2 tablets by mouth 2 times daily, Disp: 120 tablet, Rfl: 0    hydroCHLOROthiazide (MICROZIDE) 12.5 MG capsule, Take 1 capsule by mouth every morning, Disp: 90 capsule, Rfl: 1    naproxen (NAPROSYN) 500 MG tablet, TAKE 1 TABLET BY MOUTH TWICE DAILY WITH MEALS, Disp: 60 tablet, Rfl: 5    albuterol sulfate HFA (VENTOLIN HFA) 108 (90 Base) MCG/ACT inhaler, Inhale 2 puffs into the lungs every 6 hours as needed for Wheezing, Disp: 1 Inhaler, Rfl: 5    budesonide-formoterol (SYMBICORT) 160-4.5 MCG/ACT AERO, Inhale 2 puffs into the lungs 2 times daily, Disp: 1 Inhaler, Rfl: 5    methocarbamol (ROBAXIN) 750 MG tablet, Take 1 tablet by mouth 3 times daily, Disp: 90 tablet, Rfl: 1    Yrfolx-SyWaoj-Ztqnf-FA-Omega 3 (DUET DHA) 25-1 & 400 MG MISC, Take 1 tablet by mouth, Disp: , Rfl:     Patient Active Problem List   Diagnosis    Endometriosis    Chest pain    Pneumothorax    Asthma    Vitamin D deficiency    Bronchitis    Mild intermittent asthma without complication    History of pneumothorax    HTN (hypertension), benign    Menorrhagia with regular cycle    Intramural, submucous, and subserous leiomyoma of uterus    Endometriosis of pelvic peritoneum    Snores    Chronic right-sided low back pain with right-sided sciatica    Hydronephrosis    Adult BMI 28.0-28.9 kg/sq m    Chronic fatigue    Pleurisy    DDD (degenerative disc disease), cervical    Elevated BP without diagnosis of hypertension    Overweight with body mass index (BMI) of 28 to 28.9 in adult    SRINIVASA (obstructive sleep apnea)    Mixed hyperlipidemia       Review of Systems   Constitutional:  Negative for fatigue. Eyes:  Negative for photophobia, pain and visual disturbance. Respiratory:  Negative for apnea, cough, choking, chest tightness, shortness of breath and wheezing. Cardiovascular:  Negative for chest pain, palpitations and leg swelling. Gastrointestinal:  Negative for abdominal distention, abdominal pain, blood in stool, constipation, diarrhea, nausea and vomiting. Endocrine: Negative for cold intolerance and heat intolerance. Musculoskeletal:  Negative for arthralgias and myalgias. Skin:  Negative for rash. Neurological:  Negative for dizziness, tremors, syncope, weakness, numbness and headaches.    Psychiatric/Behavioral:  Negative for agitation, confusion, decreased concentration, dysphoric mood, hallucinations, sleep disturbance and suicidal ideas. The patient is not nervous/anxious and is not hyperactive. Physical Exam  Constitutional:       Appearance: She is well-developed. HENT:      Head: Normocephalic. Eyes:      Conjunctiva/sclera: Conjunctivae normal.   Abdominal:      General: Abdomen is protuberant. Musculoskeletal:         General: No swelling. Neurological:      Mental Status: She is alert and oriented to person, place, and time. Psychiatric:         Mood and Affect: Mood normal.         Behavior: Behavior normal.         Thought Content: Thought content normal.         Judgment: Judgment normal.       Orders Only on 06/09/2022   Component Date Value Ref Range Status    TSH 06/09/2022 1.47  0.27 - 4.20 uIU/mL Final    Vit D, 25-Hydroxy 06/09/2022 22.7 (A) >=30 ng/mL Final    Comment: <=20 ng/mL. ........... Coby Hugo Deficient  21-29 ng/mL. ......... Coby Hugo Insufficient  >=30 ng/mL. ........ Coby Hugo Sufficient      Vitamin B-12 06/09/2022 560  211 - 911 pg/mL Final    Folate 06/09/2022 14.93  4.78 - 24.20 ng/mL Final    Comment: Effective 11-15-16 10:00am EST  Please note reference ranges have  changed for Folate.       Magnesium 06/09/2022 1.70 (A) 1.80 - 2.40 mg/dL Final    Cholesterol, Total 06/09/2022 236 (A) 0 - 199 mg/dL Final    Triglycerides 06/09/2022 47  0 - 150 mg/dL Final    HDL 06/09/2022 80 (A) 40 - 60 mg/dL Final    LDL Calculated 06/09/2022 147 (A) <100 mg/dL Final    VLDL Cholesterol Calculated 06/09/2022 9  Not Established mg/dL Final    Hemoglobin A1C 06/09/2022 5.7  See comment % Final    Comment: Comment:  Diagnosis of Diabetes: > or = 6.5%  Increased risk of diabetes (Prediabetes): 5.7-6.4%  Glycemic Control: Nonpregnant Adults: <7.0%                    Pregnant: <6.0%        eAG 06/09/2022 116.9  mg/dL Final    Sodium 06/09/2022 138  136 - 145 mmol/L Final    Potassium 06/09/2022 4.3  3.5 - 5.1 mmol/L Final    Chloride 06/09/2022 101  99 - 110 mmol/L Final    CO2 06/09/2022 24  21 - 32 mmol/L Final    Anion Gap 06/09/2022 13  3 - 16 Final    Glucose 06/09/2022 89  70 - 99 mg/dL Final    BUN 06/09/2022 10  7 - 20 mg/dL Final    Creatinine 06/09/2022 0.8  0.6 - 1.1 mg/dL Final    GFR Non- 06/09/2022 >60  >60 Final    Comment: >60 mL/min/1.73m2 EGFR, calc. for ages 25 and older using the  MDRD formula (not corrected for weight), is valid for stable  renal function. GFR  06/09/2022 >60  >60 Final    Comment: Chronic Kidney Disease: less than 60 ml/min/1.73 sq.m. Kidney Failure: less than 15 ml/min/1.73 sq.m. Results valid for patients 18 years and older.       Calcium 06/09/2022 9.8  8.3 - 10.6 mg/dL Final    Total Protein 06/09/2022 7.4  6.4 - 8.2 g/dL Final    Albumin 06/09/2022 4.5  3.4 - 5.0 g/dL Final    Albumin/Globulin Ratio 06/09/2022 1.6  1.1 - 2.2 Final    Total Bilirubin 06/09/2022 0.5  0.0 - 1.0 mg/dL Final    Alkaline Phosphatase 06/09/2022 52  40 - 129 U/L Final    ALT 06/09/2022 13  10 - 40 U/L Final    AST 06/09/2022 20  15 - 37 U/L Final    WBC 06/09/2022 5.6  4.0 - 11.0 K/uL Final    RBC 06/09/2022 4.65  4.00 - 5.20 M/uL Final    Hemoglobin 06/09/2022 14.0  12.0 - 16.0 g/dL Final    Hematocrit 06/09/2022 41.7  36.0 - 48.0 % Final    MCV 06/09/2022 89.7  80.0 - 100.0 fL Final    MCH 06/09/2022 30.2  26.0 - 34.0 pg Final    MCHC 06/09/2022 33.6  31.0 - 36.0 g/dL Final    RDW 06/09/2022 13.6  12.4 - 15.4 % Final    Platelets 72/08/3766 260  135 - 450 K/uL Final    MPV 06/09/2022 8.2  5.0 - 10.5 fL Final    Neutrophils % 06/09/2022 67.8  % Final    Lymphocytes % 06/09/2022 19.6  % Final    Monocytes % 06/09/2022 8.7  % Final    Eosinophils % 06/09/2022 3.5  % Final    Basophils % 06/09/2022 0.4  % Final    Neutrophils Absolute 06/09/2022 3.8  1.7 - 7.7 K/uL Final    Lymphocytes Absolute 06/09/2022 1.1  1.0 - 5.1 K/uL Final    Monocytes Absolute 06/09/2022 0.5  0.0 - 1.3 K/uL Final    Eosinophils Absolute 06/09/2022 0.2  0.0 - 0.6 K/uL Final Basophils Absolute 06/09/2022 0.0  0.0 - 0.2 K/uL Final         Assessment and Plan:  1. Overweight (BMI 25.0-29. 9)  Improving, not at goal.  Continue current management. Contrave refilled. F/u 4 weeks. 2. Dietary counseling and surveillance  1200-Lauro/low carb meal plan. Avoid skipping meals. Plan/prep meals ahead of time. Nutrition Plan: [] LCHF/Ketogenic   [x] Modified low-calorie diet (low carb/low-lauro)               [] Low-calorie diet    [] Maintenance       []Other        Exercise: [x] Cardio     [x] Resistance/strength training                       [x] ACSM recommendations (150 minutes/week in active weight loss)               Behavior: [x] Motivational interviewing performed    [] Referral for counseling                         [x] Discussed strategies to overcome habits/challenges for focus         [] Stress management   [x] Stimulus control         [] Sleep hygiene      Reviewed:  [x] Nutrition and the importance of regular protein intake  [x] Hidden carbohydrate sources  [x] Alcohol use  [x] Tobacco use   [x] Drug use- Denies  [x] Importance of exercise and reducing sedentary time  [x] Treatment consent- Patient understands and agrees with the treatment plan   [x] Proper use of medication and side effects      Controlled Substance Monitoring:    No flowsheet data found. Patient denies any history of cardiovascular disease (e.g., CAD, stroke, arrhythmias, CHF, uncontrolled HTN), seizure disorder, MAOI use within the last 2 weeks, hyperthyroidism, glaucoma, agitated states, history of drug abuse, pregnancy, nursing, known hypersensitivity to the prescribing meds, history of pancreatitis, or personal or family history of thyroid medullary cancer. Start weight: 198 pounds  Goal: At least 9.5 pounds by 11/30/22  Total weight loss: 9 pounds        Key dietary points:    - Meats (preferably organic or grass fed) are great sources of protein and have no carbohydrates.   - Recommend coconut, olive, avocado, or almond oils. - When buying dairy, choose regular or full fat options. - Choose vegetables that grow above ground as they are generally lower in carbohydrates and higher in fiber.  - Avoid starches such as bread, rice, potatoes, pasta and all sources of simple sugars (desserts, soda, breakfast cereals). - Choose beverages that are calorie and sugar free. Reminder regarding weight loss medications: You must be seen in office every 2-4 weeks to haveyour prescriptions refilled. If you are off of your medication for longer than 7 days, you will not be able to restart the medication for at least 6 months. Always call our office to report any side effects. Females, it is your responsibility to obtain negative pregnancy tests each month. No orders of the defined types were placed in this encounter. No follow-ups on file. Sunil La is a 52 y.o. female being evaluated by a Virtual Visit (video visit) encounter to address concerns as mentioned above. A caregiver was present when appropriate. Due to this being a TeleHealth encounter (During FTMJV-04 public health emergency), evaluation of the following organ systems was limited: Vitals/Constitutional/EENT/Resp/CV/GI//MS/Neuro/Skin/Heme-Lymph-Imm. Pursuant to the emergency declaration under the [facility], [address] [place] [address] authority and the [employer] and [employer] General Act, this Virtual Visit was conducted with patient's (and/or legal guardian's) consent, to reduce the patient's risk of exposure to COVID-19 and provide necessary medical care. The patient (and/or legal guardian) has also been advised to contact this office for worsening conditions or problems, and seek emergency medical treatment and/or call 911 if deemed necessary.

## 2022-10-19 ENCOUNTER — TELEMEDICINE (OUTPATIENT)
Dept: BARIATRICS/WEIGHT MGMT | Age: 47
End: 2022-10-19
Payer: COMMERCIAL

## 2022-10-19 DIAGNOSIS — E66.3 OVERWEIGHT (BMI 25.0-29.9): Primary | ICD-10-CM

## 2022-10-19 DIAGNOSIS — Z71.3 DIETARY COUNSELING AND SURVEILLANCE: ICD-10-CM

## 2022-10-19 PROCEDURE — 99214 OFFICE O/P EST MOD 30 MIN: CPT | Performed by: FAMILY MEDICINE

## 2022-10-19 RX ORDER — NALTREXONE HYDROCHLORIDE AND BUPROPION HYDROCHLORIDE 8; 90 MG/1; MG/1
2 TABLET, EXTENDED RELEASE ORAL 2 TIMES DAILY
Qty: 120 TABLET | Refills: 0 | Status: SHIPPED | OUTPATIENT
Start: 2022-10-19

## 2022-10-19 ASSESSMENT — ENCOUNTER SYMPTOMS
COUGH: 0
PHOTOPHOBIA: 0
CHEST TIGHTNESS: 0
SHORTNESS OF BREATH: 0
NAUSEA: 0
CONSTIPATION: 0
ABDOMINAL DISTENTION: 0
CHOKING: 0
DIARRHEA: 0
VOMITING: 0
BLOOD IN STOOL: 0
WHEEZING: 0
EYE PAIN: 0
APNEA: 0
ABDOMINAL PAIN: 0

## 2022-10-19 NOTE — PROGRESS NOTES
Patient: Jack Luu                      Encounter Date: 10/19/2022    YOB: 1975                Age: 52 y.o. Chief Complaint   Patient presents with    Weight Management     F/u MWM             Patient identification was verified at the start of the visit. Patient-Reported Vitals 10/19/2022   Patient-Reported Weight 188. 2   Patient-Reported Height 510   Patient-Reported Systolic 592   Patient-Reported Diastolic 96   Patient-Reported Pulse 94   Patient-Reported Temperature 98.9   Patient-Reported SpO2 96         BP Readings from Last 1 Encounters:   07/11/22 124/82       BMI Readings from Last 1 Encounters:   07/25/22 28.52 kg/m²       Pulse Readings from Last 1 Encounters:   07/11/22 73       HPI: 52 y.o. female with a long-standing history of obesity presents today for virtual video follow-up. She has lost a pound since her last visit. Current treatment includes Contrave and low carb/vincent diet. Has been under more stress lately (notified she has to return back to the office after being [facility] for the past few years). Stress eating (fast foods). Motivated to get back on track. Medication(s): Appetite well controlled? []Yes      [x]No                          Focus:     []Good     [x]Fair     []Poor                          Side effects? No       Any recent change in medication(s)? No     Exercise: []Cardio     []Resistance/strength training     [x]Other:  Yoga        No Known Allergies      Current Outpatient Medications:     naltrexone-buPROPion (CONTRAVE) 8-90 MG per extended release tablet, Take 2 tablets by mouth 2 times daily, Disp: 120 tablet, Rfl: 0    hydroCHLOROthiazide (MICROZIDE) 12.5 MG capsule, Take 1 capsule by mouth every morning, Disp: 90 capsule, Rfl: 1    naproxen (NAPROSYN) 500 MG tablet, TAKE 1 TABLET BY MOUTH TWICE DAILY WITH MEALS, Disp: 60 tablet, Rfl: 5    albuterol sulfate HFA (VENTOLIN HFA) 108 (90 Base) MCG/ACT inhaler, Inhale 2 puffs into the lungs every 6 hours as needed for Wheezing, Disp: 1 Inhaler, Rfl: 5    budesonide-formoterol (SYMBICORT) 160-4.5 MCG/ACT AERO, Inhale 2 puffs into the lungs 2 times daily, Disp: 1 Inhaler, Rfl: 5    methocarbamol (ROBAXIN) 750 MG tablet, Take 1 tablet by mouth 3 times daily, Disp: 90 tablet, Rfl: 1    Gotbqi-AgBhna-Kvguj-FA-Omega 3 (DUET DHA) 25-1 & 400 MG MISC, Take 1 tablet by mouth, Disp: , Rfl:     Patient Active Problem List   Diagnosis    Endometriosis    Chest pain    Pneumothorax    Asthma    Vitamin D deficiency    Bronchitis    Mild intermittent asthma without complication    History of pneumothorax    HTN (hypertension), benign    Menorrhagia with regular cycle    Intramural, submucous, and subserous leiomyoma of uterus    Endometriosis of pelvic peritoneum    Snores    Chronic right-sided low back pain with right-sided sciatica    Hydronephrosis    Adult BMI 28.0-28.9 kg/sq m    Chronic fatigue    Pleurisy    DDD (degenerative disc disease), cervical    Elevated BP without diagnosis of hypertension    Overweight with body mass index (BMI) of 28 to 28.9 in adult    SRINIVASA (obstructive sleep apnea)    Mixed hyperlipidemia       Review of Systems   Constitutional:  Negative for fatigue. Eyes:  Negative for photophobia, pain and visual disturbance. Respiratory:  Negative for apnea, cough, choking, chest tightness, shortness of breath and wheezing. Cardiovascular:  Negative for chest pain, palpitations and leg swelling. Gastrointestinal:  Negative for abdominal distention, abdominal pain, blood in stool, constipation, diarrhea, nausea and vomiting. Endocrine: Negative for cold intolerance and heat intolerance. Musculoskeletal:  Negative for arthralgias and myalgias. Skin:  Negative for rash. Neurological:  Negative for dizziness, tremors, syncope, weakness, numbness and headaches.    Psychiatric/Behavioral:  Negative for agitation, confusion, decreased concentration, dysphoric mood, hallucinations, sleep disturbance and suicidal ideas. The patient is not nervous/anxious and is not hyperactive. Physical Exam  Constitutional:       Appearance: She is well-developed. HENT:      Head: Normocephalic. Eyes:      Conjunctiva/sclera: Conjunctivae normal.   Abdominal:      General: Abdomen is protuberant. Musculoskeletal:         General: No swelling. Neurological:      Mental Status: She is alert and oriented to person, place, and time. Psychiatric:         Mood and Affect: Mood normal.         Behavior: Behavior normal.         Thought Content: Thought content normal.         Judgment: Judgment normal.       Procedure visit on 09/19/2022   Component Date Value Ref Range Status    Left Ventricular Ejection Fraction 09/19/2022 58   Final-Edited    LVEF MODALITY 09/19/2022 ECHO   Final-Edited         Assessment and Plan:  1. Overweight (BMI 25.0-29. 9)  Improving, not at goal.  Continue low carb/vincent meal plan. Stress management. Increase exercise. Contrave refilled. F/u 4 weeks. In-office weight check before next visit. 2. Dietary counseling and surveillance  Low carb/vincent meal plan. Avoid skipping meals. Use distraction techniques to avoid boredom/stress eating. Plan/prep meals ahead of time. Be mindful of portion sizes.        Nutrition Plan: [] LCHF/Ketogenic   [x] Modified low-calorie diet (low carb/low-vincent)               [] Low-calorie diet    [] Maintenance       []Other        Exercise: [x] Cardio     [x] Resistance/strength training                       [x] ACSM recommendations (150 minutes/week in active weight loss)               Behavior: [x] Motivational interviewing performed    [] Referral for counseling                         [x] Discussed strategies to overcome habits/challenges for focus         [] Stress management   [x] Stimulus control         [] Sleep hygiene      Reviewed:  [x] Nutrition and the importance of regular protein intake  [x] Hidden carbohydrate sources  [x] Alcohol use  [x] Tobacco use   [x] Drug use- Denies  [x] Importance of exercise and reducing sedentary time  [x] Treatment consent- Patient understands and agrees with the treatment plan   [x] Proper use of medication and side effects      Controlled Substance Monitoring:    TStart weight: 198 pounds  Goal: At least 9.5 pounds by 11/30/22  Total weight loss: 9 pounds           Key dietary points:    - Meats (preferably organic or grass fed) are great sources of protein and have no carbohydrates. - Recommend coconut, olive, avocado, or almond oils. - When buying dairy, choose regular or full fat options. - Choose vegetables that grow above ground as they are generally lower in carbohydrates and higher in fiber.  - Avoid starches such as bread, rice, potatoes, pasta and all sources of simple sugars (desserts, soda, breakfast cereals). - Choose beverages that are calorie and sugar free. Reminder regarding weight loss medications: You must be seen in office every 2-4 weeks to haveyour prescriptions refilled. If you are off of your medication for longer than 7 days, you will not be able to restart the medication for at least 6 months. Always call our office to report any side effects. Females, it is your responsibility to obtain negative pregnancy tests each month. No orders of the defined types were placed in this encounter. Return in about 4 weeks (around 11/16/2022) for MWM, meds. Charley Gomez is a 52 y.o. female being evaluated by a Virtual Visit (video visit) encounter to address concerns as mentioned above. A caregiver was present when appropriate. Due to this being a TeleHealth encounter (During PXUXV-66 public health emergency), evaluation of the following organ systems was limited: Vitals/Constitutional/EENT/Resp/CV/GI//MS/Neuro/Skin/Heme-Lymph-Imm.   Pursuant to the emergency declaration under the [facility] waiver authority and the Coronavirus Preparedness and Response Supplemental Appropriations Act, this Virtual Visit was conducted with patient's (and/or legal guardian's) consent, to reduce the patient's risk of exposure to COVID-19 and provide necessary medical care. The patient (and/or legal guardian) has also been advised to contact this office for worsening conditions or problems, and seek emergency medical treatment and/or call 911 if deemed necessary.

## 2022-11-23 ENCOUNTER — TELEMEDICINE (OUTPATIENT)
Dept: BARIATRICS/WEIGHT MGMT | Age: 47
End: 2022-11-23
Payer: COMMERCIAL

## 2022-11-23 VITALS — WEIGHT: 188.6 LBS | BODY MASS INDEX: 27.06 KG/M2

## 2022-11-23 DIAGNOSIS — E66.3 OVERWEIGHT (BMI 25.0-29.9): Primary | ICD-10-CM

## 2022-11-23 DIAGNOSIS — Z71.3 DIETARY COUNSELING AND SURVEILLANCE: ICD-10-CM

## 2022-11-23 PROCEDURE — 99214 OFFICE O/P EST MOD 30 MIN: CPT | Performed by: FAMILY MEDICINE

## 2022-11-23 RX ORDER — NALTREXONE HYDROCHLORIDE AND BUPROPION HYDROCHLORIDE 8; 90 MG/1; MG/1
2 TABLET, EXTENDED RELEASE ORAL 2 TIMES DAILY
Qty: 120 TABLET | Refills: 0 | Status: SHIPPED | OUTPATIENT
Start: 2022-11-23

## 2022-11-23 ASSESSMENT — ENCOUNTER SYMPTOMS
CHOKING: 0
WHEEZING: 0
NAUSEA: 0
SHORTNESS OF BREATH: 0
BLOOD IN STOOL: 0
EYE PAIN: 0
APNEA: 0
CHEST TIGHTNESS: 0
PHOTOPHOBIA: 0
CONSTIPATION: 0
COUGH: 0
ABDOMINAL DISTENTION: 0
DIARRHEA: 0
VOMITING: 0
ABDOMINAL PAIN: 0

## 2022-11-23 NOTE — PROGRESS NOTES
Patient: Monroe Lou                      Encounter Date: 11/23/2022    YOB: 1975                Age: 52 y.o. Chief Complaint   Patient presents with    Weight Management     F/u MWM         Patient identification was verified at the start of the visit. Patient-Reported Vitals 11/23/2022   Patient-Reported Weight 187   Patient-Reported Height 510   Patient-Reported Systolic 352   Patient-Reported Diastolic 81   Patient-Reported Pulse 79   Patient-Reported Temperature 98.9   Patient-Reported SpO2 97         BP Readings from Last 1 Encounters:   07/11/22 124/82       BMI Readings from Last 1 Encounters:   11/23/22 27.06 kg/m²       Pulse Readings from Last 1 Encounters:   07/11/22 73     Wt Readings from Last 3 Encounters:   11/23/22 188 lb 9.6 oz (85.5 kg)   07/25/22 198 lb 12.8 oz (90.2 kg)   07/11/22 199 lb (90.3 kg)         HPI: 52 y.o. female with a long-standing history of obesity presents today for virtual video follow-up. She has lost a pound since her last visit. Current treatment includes Contrave and low carb/vincent diet. No issues. Medication(s): Appetite well controlled? []Yes      [x]No                          Focus:     []Good     [x]Fair     []Poor                          Side effects? No       Any recent change in medication(s)? No     Exercise: []Cardio     []Resistance/strength training     [x]Other:  Yoga           No Known Allergies      Current Outpatient Medications:     naltrexone-buPROPion (CONTRAVE) 8-90 MG per extended release tablet, Take 2 tablets by mouth 2 times daily, Disp: 120 tablet, Rfl: 0    hydroCHLOROthiazide (MICROZIDE) 12.5 MG capsule, Take 1 capsule by mouth every morning, Disp: 90 capsule, Rfl: 1    naproxen (NAPROSYN) 500 MG tablet, TAKE 1 TABLET BY MOUTH TWICE DAILY WITH MEALS, Disp: 60 tablet, Rfl: 5    albuterol sulfate HFA (VENTOLIN HFA) 108 (90 Base) MCG/ACT inhaler, Inhale 2 puffs into the lungs every 6 hours as needed for Wheezing, Disp: 1 Inhaler, Rfl: 5    budesonide-formoterol (SYMBICORT) 160-4.5 MCG/ACT AERO, Inhale 2 puffs into the lungs 2 times daily, Disp: 1 Inhaler, Rfl: 5    methocarbamol (ROBAXIN) 750 MG tablet, Take 1 tablet by mouth 3 times daily, Disp: 90 tablet, Rfl: 1    Bozssd-AnMbez-Ladgq-FA-Omega 3 (DUET DHA) 25-1 & 400 MG MISC, Take 1 tablet by mouth, Disp: , Rfl:     Patient Active Problem List   Diagnosis    Endometriosis    Chest pain    Pneumothorax    Asthma    Vitamin D deficiency    Bronchitis    Mild intermittent asthma without complication    History of pneumothorax    HTN (hypertension), benign    Menorrhagia with regular cycle    Intramural, submucous, and subserous leiomyoma of uterus    Endometriosis of pelvic peritoneum    Snores    Chronic right-sided low back pain with right-sided sciatica    Hydronephrosis    Adult BMI 28.0-28.9 kg/sq m    Chronic fatigue    Pleurisy    DDD (degenerative disc disease), cervical    Elevated BP without diagnosis of hypertension    Overweight with body mass index (BMI) of 28 to 28.9 in adult    SRINIVASA (obstructive sleep apnea)    Mixed hyperlipidemia       Review of Systems   Constitutional:  Negative for fatigue. Eyes:  Negative for photophobia, pain and visual disturbance. Respiratory:  Negative for apnea, cough, choking, chest tightness, shortness of breath and wheezing. Cardiovascular:  Negative for chest pain, palpitations and leg swelling. Gastrointestinal:  Negative for abdominal distention, abdominal pain, blood in stool, constipation, diarrhea, nausea and vomiting. Endocrine: Negative for cold intolerance and heat intolerance. Musculoskeletal:  Negative for arthralgias and myalgias. Skin:  Negative for rash. Neurological:  Negative for dizziness, tremors, syncope, weakness, numbness and headaches. Psychiatric/Behavioral:  Negative for agitation, confusion, decreased concentration, dysphoric mood, hallucinations, sleep disturbance and suicidal ideas.  The patient is not nervous/anxious and is not hyperactive. Physical Exam  Constitutional:       Appearance: She is well-developed. HENT:      Head: Normocephalic. Eyes:      Conjunctiva/sclera: Conjunctivae normal.   Abdominal:      General: Abdomen is protuberant. Musculoskeletal:         General: No swelling. Neurological:      Mental Status: She is alert and oriented to person, place, and time. Psychiatric:         Mood and Affect: Mood normal.         Behavior: Behavior normal.         Thought Content: Thought content normal.         Judgment: Judgment normal.       Procedure visit on 09/19/2022   Component Date Value Ref Range Status    Left Ventricular Ejection Fraction 09/19/2022 58   Final-Edited    LVEF MODALITY 09/19/2022 ECHO   Final-Edited         Assessment and Plan:  1. Overweight (BMI 25.0-29. 9)  Improving, not at goal.  Continue current management- Contrave (refill provided), low carb/vincent meal plan. F/u 4 weeks. 2. Dietary counseling and surveillance  Low carb/vincent meal plan as prescribed.        Nutrition Plan: [] LCHF/Ketogenic   [x] Modified low-calorie diet (low carb/low-vincent)               [] Low-calorie diet    [] Maintenance       []Other        Exercise: [x] Cardio     [x] Resistance/strength training                       [x] ACSM recommendations (150 minutes/week in active weight loss)               Behavior: [x] Motivational interviewing performed    [] Referral for counseling                         [x] Discussed strategies to overcome habits/challenges for focus         [] Stress management   [x] Stimulus control         [] Sleep hygiene      Reviewed:  [x] Nutrition and the importance of regular protein intake  [x] Hidden carbohydrate sources  [x] Alcohol use  [x] Tobacco use   [x] Drug use- Denies  [x] Importance of exercise and reducing sedentary time  [x] Treatment consent- Patient understands and agrees with the treatment plan   [x] Proper use of medication and side effects      Treatment start date: 11/24/22  12 weeks: 2/24/23  Starting weight: 188 pounds    Goal: At least 5% (9 pounds)     TStart weight: 198 pounds  Goal: At least 9.5 pounds by 11/30/22  Total weight loss: 10 pounds        Key dietary points:    - Meats (preferably organic or grass fed) are great sources of protein and have no carbohydrates. - Recommend coconut, olive, avocado, or almond oils. - When buying dairy, choose regular or full fat options. - Choose vegetables that grow above ground as they are generally lower in carbohydrates and higher in fiber.  - Avoid starches such as bread, rice, potatoes, pasta and all sources of simple sugars (desserts, soda, breakfast cereals). - Choose beverages that are calorie and sugar free. Reminder regarding weight loss medications: You must be seen in office every 2-4 weeks to haveyour prescriptions refilled. If you are off of your medication for longer than 7 days, you will not be able to restart the medication for at least 6 months. Always call our office to report any side effects. Females, it is your responsibility to obtain negative pregnancy tests each month. No orders of the defined types were placed in this encounter. No follow-ups on file. Sunil La is a 52 y.o. female being evaluated by a Virtual Visit (video visit) encounter to address concerns as mentioned above. A caregiver was present when appropriate. Due to this being a TeleHealth encounter (During [place] [address] public health emergency), evaluation of the following organ systems was limited: Vitals/Constitutional/EENT/Resp/CV/GI//MS/Neuro/Skin/Heme-Lymph-Imm.   Pursuant to the emergency declaration under the [facility] authority and the [employer] Act, this Virtual Visit was conducted with patient's (and/or legal guardian's) consent, to reduce the patient's risk of exposure to COVID-19 and provide necessary medical care. The patient (and/or legal guardian) has also been advised to contact this office for worsening conditions or problems, and seek emergency medical treatment and/or call 911 if deemed necessary. Skin Substitute: EpiFix Micronized

## 2023-01-04 ENCOUNTER — TELEPHONE (OUTPATIENT)
Dept: PULMONOLOGY | Age: 48
End: 2023-01-04

## 2023-01-04 NOTE — TELEPHONE ENCOUNTER
PT called and would like to know if we can fill out her FMLA intermitent paperwork.     [address] [address]: [phone]

## 2023-01-04 NOTE — TELEPHONE ENCOUNTER
Called pt and she has to go back to work in [facility], [place], operated by [employer] and would like intermittent leave in case she has an episode where she has to take the Naproxen and lay down due to her pleurisy. She has been working from home for 3 years now and could just do this while there. Pt informed will ask Dr Pavan Murillo when he returns to office next week and call her back. She stated that would be fine.

## 2023-01-05 DIAGNOSIS — F41.1 GENERALIZED ANXIETY DISORDER: Primary | ICD-10-CM

## 2023-01-18 ENCOUNTER — OFFICE VISIT (OUTPATIENT)
Dept: PRIMARY CARE CLINIC | Age: 48
End: 2023-01-18
Payer: COMMERCIAL

## 2023-01-18 VITALS
HEART RATE: 79 BPM | DIASTOLIC BLOOD PRESSURE: 80 MMHG | OXYGEN SATURATION: 99 % | RESPIRATION RATE: 14 BRPM | BODY MASS INDEX: 28.49 KG/M2 | WEIGHT: 199 LBS | HEIGHT: 70 IN | SYSTOLIC BLOOD PRESSURE: 122 MMHG

## 2023-01-18 DIAGNOSIS — Z01.818 PREOP EXAMINATION: ICD-10-CM

## 2023-01-18 DIAGNOSIS — D25.9 UTERINE LEIOMYOMA, UNSPECIFIED LOCATION: Primary | ICD-10-CM

## 2023-01-18 PROBLEM — R03.0 ELEVATED BP WITHOUT DIAGNOSIS OF HYPERTENSION: Status: RESOLVED | Noted: 2022-06-01 | Resolved: 2023-01-18

## 2023-01-18 PROCEDURE — 3074F SYST BP LT 130 MM HG: CPT | Performed by: INTERNAL MEDICINE

## 2023-01-18 PROCEDURE — 3079F DIAST BP 80-89 MM HG: CPT | Performed by: INTERNAL MEDICINE

## 2023-01-18 PROCEDURE — 99214 OFFICE O/P EST MOD 30 MIN: CPT | Performed by: INTERNAL MEDICINE

## 2023-01-18 SDOH — ECONOMIC STABILITY: FOOD INSECURITY: WITHIN THE PAST 12 MONTHS, THE FOOD YOU BOUGHT JUST DIDN'T LAST AND YOU DIDN'T HAVE MONEY TO GET MORE.: NEVER TRUE

## 2023-01-18 SDOH — ECONOMIC STABILITY: FOOD INSECURITY: WITHIN THE PAST 12 MONTHS, YOU WORRIED THAT YOUR FOOD WOULD RUN OUT BEFORE YOU GOT MONEY TO BUY MORE.: NEVER TRUE

## 2023-01-18 ASSESSMENT — ENCOUNTER SYMPTOMS
ABDOMINAL PAIN: 0
SINUS PRESSURE: 0
CHEST TIGHTNESS: 0
COLOR CHANGE: 0
EYE REDNESS: 0
EYE PAIN: 0
SHORTNESS OF BREATH: 0
WHEEZING: 0
VOMITING: 0
BACK PAIN: 0
CONSTIPATION: 0
TROUBLE SWALLOWING: 0
COUGH: 0
DIARRHEA: 0
BLOOD IN STOOL: 0
ABDOMINAL DISTENTION: 0
NAUSEA: 0
SORE THROAT: 0

## 2023-01-18 ASSESSMENT — PATIENT HEALTH QUESTIONNAIRE - PHQ9
SUM OF ALL RESPONSES TO PHQ QUESTIONS 1-9: 0
1. LITTLE INTEREST OR PLEASURE IN DOING THINGS: 0
SUM OF ALL RESPONSES TO PHQ QUESTIONS 1-9: 0
SUM OF ALL RESPONSES TO PHQ9 QUESTIONS 1 & 2: 0
2. FEELING DOWN, DEPRESSED OR HOPELESS: 0
SUM OF ALL RESPONSES TO PHQ QUESTIONS 1-9: 0
SUM OF ALL RESPONSES TO PHQ QUESTIONS 1-9: 0

## 2023-01-18 ASSESSMENT — SOCIAL DETERMINANTS OF HEALTH (SDOH): HOW HARD IS IT FOR YOU TO PAY FOR THE VERY BASICS LIKE FOOD, HOUSING, MEDICAL CARE, AND HEATING?: NOT HARD AT ALL

## 2023-01-18 NOTE — PROGRESS NOTES
Preoperative Assessment    Date of Surgery:  1/27/22    Reason for Surgery: Partial Hysterectomy   Chief Complaint: Uterine leiomyoma      This patient presents to the office today for a preoperative consultation at the request of surgeon, Dr. Tawny Brittle anesthesia:  Merl Sacks  Prior anesthesia: YES  Known anesthesia problems: Nausea in recovery  Recent corticosteroid use NO  Blood thinner use NO  H/o SRINIVASA NO    Recent symptoms does not include fever, chills, chest pain, cough, dyspnea, dysuria, urinary frequency, nausea, vomiting, diarrhea, abdominal pain, easy bruising, lower extremity swelling or poor exercise tolerance. Pertinent medical history includes prior anesthesia, asthma, hypertension,  hyperlipidemia while pertinent medical history does not include previous anesthesia reaction, diabetes, cardiovascular disease,  renal disease, sleep apnea, thromboembolic problems, clotting disorder, bleeding disorder, transfusion reaction, impaired immunity or corticosteroid use in the last six months. Pertinent family history includes GM (paternal ) sudden death MI, CVA while pertinent family history does not include anesthesia reaction, aneurysm,  clotting disorder or bleeding disorder. Pertinent social history does not include  tobacco use  alcohol use, illicit drug use, transfusion refusal, wearing dentures or partial plates or concerns regarding care after surgery. After surgery the patient plans to recover at home with family.     Has Acute Coronary Syndrome (ACS): No  Revised cardiac risk index - 0  Functional Capacity 4+ METS     Bleeding risk: No recent or remote history of abnormal bleeding  Past Surgical History:   Procedure Laterality Date    CHEST TUBE INSERTION Right     2 times    LAPAROSCOPY  1998      No Known Allergies   Recent Labs:  CBC:   Lab Results   Component Value Date/Time    WBC 5.6 06/09/2022 01:45 PM    HGB 14.0 06/09/2022 01:45 PM    HCT 41.7 06/09/2022 01:45 PM    MCH 30.2 06/09/2022 01:45 PM    MCHC 33.6 06/09/2022 01:45 PM    RDW 13.6 06/09/2022 01:45 PM     06/09/2022 01:45 PM    MPV 8.2 06/09/2022 01:45 PM     CMP:   Lab Results   Component Value Date/Time     06/09/2022 01:45 PM    K 4.3 06/09/2022 01:45 PM     06/09/2022 01:45 PM    CO2 24 06/09/2022 01:45 PM    ANIONGAP 13 06/09/2022 01:45 PM    GLUCOSE 89 06/09/2022 01:45 PM    BUN 10 06/09/2022 01:45 PM    CREATININE 0.8 06/09/2022 01:45 PM    GFRAA >60 06/09/2022 01:45 PM    GFRAA >60 12/13/2012 11:17 AM    CALCIUM 9.8 06/09/2022 01:45 PM    PROT 7.4 06/09/2022 01:45 PM    PROT 7.5 12/13/2012 11:17 AM    LABALBU 4.5 06/09/2022 01:45 PM    AGRATIO 1.6 06/09/2022 01:45 PM    BILITOT 0.5 06/09/2022 01:45 PM    ALKPHOS 52 06/09/2022 01:45 PM    ALT 13 06/09/2022 01:45 PM    AST 20 06/09/2022 01:45 PM    GLOB 2.9 07/29/2021 09:33 AM     EKG Interpretation:  Sinus  Rhythm, Low voltage -possible pulmonary disease. (7/11/22)    Review of Systems   Constitutional:  Negative for activity change, appetite change, chills, fatigue, fever and unexpected weight change. HENT:  Negative for congestion, ear pain, postnasal drip, sinus pressure, sore throat, tinnitus and trouble swallowing. Eyes:  Negative for pain and redness. Respiratory:  Negative for cough, chest tightness, shortness of breath and wheezing. Cardiovascular:  Negative for chest pain, palpitations and leg swelling. Gastrointestinal:  Negative for abdominal distention, abdominal pain, blood in stool, constipation, diarrhea, nausea and vomiting. Endocrine: Negative for cold intolerance, heat intolerance and polydipsia. Genitourinary:  Negative for decreased urine volume, dysuria, flank pain, frequency, hematuria and urgency. Musculoskeletal:  Negative for arthralgias, back pain, joint swelling, neck pain and neck stiffness. Skin:  Negative for color change and rash. Neurological:  Negative for dizziness, weakness, numbness and headaches. Hematological:  Negative for adenopathy. Psychiatric/Behavioral:  Negative for behavioral problems, sleep disturbance and suicidal ideas. The patient is not nervous/anxious. /80 (Site: Left Upper Arm, Position: Sitting, Cuff Size: Large Adult)   Pulse 79   Resp 14   Ht 5' 10\" (1.778 m)   Wt 199 lb (90.3 kg)   SpO2 99%   BMI 28.55 kg/m²    Physical Exam  Constitutional:       General: She is not in acute distress. Appearance: Normal appearance. She is not ill-appearing. HENT:      Head: Normocephalic and atraumatic. Right Ear: Tympanic membrane, ear canal and external ear normal. There is no impacted cerumen. Left Ear: Tympanic membrane, ear canal and external ear normal. There is no impacted cerumen. Mouth/Throat:      Mouth: Mucous membranes are moist.      Pharynx: No oropharyngeal exudate or posterior oropharyngeal erythema. Eyes:      Extraocular Movements: Extraocular movements intact. Conjunctiva/sclera: Conjunctivae normal.      Pupils: Pupils are equal, round, and reactive to light. Neck:      Vascular: No carotid bruit. Cardiovascular:      Rate and Rhythm: Normal rate and regular rhythm. Pulses: Normal pulses. Heart sounds: Normal heart sounds. No murmur heard. No gallop. Pulmonary:      Effort: Pulmonary effort is normal.      Breath sounds: Normal breath sounds. No wheezing, rhonchi or rales. Abdominal:      General: Abdomen is flat. Bowel sounds are normal. There is no distension. Palpations: Abdomen is soft. There is mass. Tenderness: There is no abdominal tenderness. There is no guarding or rebound. Musculoskeletal:         General: No swelling or tenderness. Normal range of motion. Cervical back: No tenderness. Lymphadenopathy:      Cervical: No cervical adenopathy. Skin:     Findings: No erythema, lesion or rash. Neurological:      General: No focal deficit present.       Mental Status: She is alert and oriented to person, place, and time. Mental status is at baseline. Cranial Nerves: No cranial nerve deficit. Motor: No weakness. Psychiatric:         Mood and Affect: Mood normal.         Behavior: Behavior normal.         Thought Content: Thought content normal.         Judgment: Judgment normal.        Assessment:    1. Uterine leiomyoma, unspecified location  Assessment & Plan:   Patient diagnosed in the past with the same   Patient scheduled to have surgery   Patient here for H&P  Requisite labs/COVID-19 nasal swab have been ordered  Follow up with specialist as per their orders   2. Preop examination  Assessment & Plan:   See above  Orders:  -     Renal Function Panel; Future  -     CBC with Auto Differential; Future     52  year old patient with planned surgery as above. Known risk factors for perioperative complications: Asthma, hypertension,  hyperlipidemia      Plan:    1. Preoperative workup as follows: Labs, ECG, COVID-19 swab  2. Change in medication regimen before surgery: Hold all blood thinners a week before surgery. 3. Deep vein thrombosis prophylaxis: As per surgeon  Patient has no cardiorespiratory symptoms with Revised cardiac risk index of 0, Functional Capacity 4+ METS . Patient is at low risk for a moderate risk NON-cardiac procedure. Patient has seen cardiology for palpitations and she will obtain cardiac clearance from her cardiologist to be able to proceed with surgery.

## 2023-01-18 NOTE — ASSESSMENT & PLAN NOTE
Patient diagnosed in the past with the same   Patient scheduled to have surgery   Patient here for H&P  Requisite labs/COVID-19 nasal swab have been ordered  Follow up with specialist as per their orders

## 2023-01-19 ENCOUNTER — TELEPHONE (OUTPATIENT)
Dept: CARDIOLOGY CLINIC | Age: 48
End: 2023-01-19

## 2023-01-19 NOTE — TELEPHONE ENCOUNTER
Pt called to request that a nurse call her to discuss there message from her Mychart.   Please advise

## 2023-01-19 NOTE — TELEPHONE ENCOUNTER
Returned call to patient per their request. She was just following up and had not seen that [address] responded. She still has palpitations but they are much better than they were the last time she saw SILVER. Pt has not further questions at this time.

## 2023-01-20 ENCOUNTER — TELEPHONE (OUTPATIENT)
Dept: CARDIOLOGY CLINIC | Age: 48
End: 2023-01-20

## 2023-01-20 NOTE — TELEPHONE ENCOUNTER
CARDIAC CLEARANCE     What type of procedure are you having? Hysterectomy    Which physician is performing your procedure? Dr. Kevon Urena    When is your procedure scheduled for? January 27th    Where are you having this procedure? [place]    Are you taking Blood Thinners? NO   If so what? (Name/dose/frequesncy)     Does the surgeon want you to stop your blood thinnners.  N/A    Phone Number and Contact Name for Physicians office:  Garrett Dubin [phone]  Ask for  Fax number to send information:    [phone]      Please end Lat EKG and Echo tracings along with clearance

## 2023-01-20 NOTE — LETTER
[facility]  [address] [address]  Phone: [phone]  Fax: [phone]    Esperanza Bernabe MD        January 24, 2023     Patient: Hernando Smith   YOB: 1975   Date of Visit: 1/20/2023       To Whom It May Concern: It is my medical opinion that Hernando Smith {Work release (duty restriction):93192}. If you have any questions or concerns, please don't hesitate to call.     Sincerely,        Esperanza Bernabe MD

## 2023-01-20 NOTE — LETTER
[facility]  [address] [address]  Phone: [phone]  Fax: [phone]    Елена Woodward MD        January 24, 2023     Patient: Gwendolyn Woodall   YOB: 1975   Date of Visit: 1/20/2023       To Whom It May Concern: It is my medical opinion that Gwendolyn Woodall is ok to proceed with her surgery, no need for a new EKG. She is low risk and is stable for this procedure. There are no apparent cardiac contraindications to the proposed procedure using standard anesthetic technique. There are no apparent interventions to ameliorate the cardiac risk. This clinical assessment assumes a full Anesthesia evaluation for overall risk of airway management, type and route of anesthetic, and other relevant anesthesia-specific considerations. If you have any questions or concerns, please don't hesitate to call.     Sincerely,        Елена Woodward MD

## 2023-01-20 NOTE — LETTER
[facility]  [address] [address]  Phone: [phone]  Fax: [phone]    Raphael Thompson MD        January 24, 2023     Patient: Yossi Daniels   YOB: 1975   Date of Visit: 1/20/2023       To Whom It May Concern: It is my medical opinion that Yossi Daniels {participation release, (activity restriction):29365}. If you have any questions or concerns, please don't hesitate to call.     Sincerely,        Raphael Thompson MD

## 2023-02-17 PROBLEM — Z01.818 PREOP EXAMINATION: Status: RESOLVED | Noted: 2023-01-18 | Resolved: 2023-02-17

## 2023-05-01 DIAGNOSIS — T75.3XXD MOTION SICKNESS, SUBSEQUENT ENCOUNTER: Primary | ICD-10-CM

## 2023-05-01 RX ORDER — SCOLOPAMINE TRANSDERMAL SYSTEM 1 MG/1
1 PATCH, EXTENDED RELEASE TRANSDERMAL
Qty: 7 PATCH | Refills: 0 | Status: SHIPPED | OUTPATIENT
Start: 2023-05-01

## 2023-05-03 ENCOUNTER — OFFICE VISIT (OUTPATIENT)
Dept: PRIMARY CARE CLINIC | Age: 48
End: 2023-05-03
Payer: COMMERCIAL

## 2023-05-03 VITALS
WEIGHT: 196 LBS | OXYGEN SATURATION: 99 % | HEIGHT: 70 IN | HEART RATE: 86 BPM | BODY MASS INDEX: 28.06 KG/M2 | RESPIRATION RATE: 14 BRPM | DIASTOLIC BLOOD PRESSURE: 78 MMHG | SYSTOLIC BLOOD PRESSURE: 122 MMHG

## 2023-05-03 DIAGNOSIS — J45.20 MILD INTERMITTENT ASTHMA WITHOUT COMPLICATION: ICD-10-CM

## 2023-05-03 DIAGNOSIS — R53.83 OTHER FATIGUE: ICD-10-CM

## 2023-05-03 DIAGNOSIS — E55.9 VITAMIN D DEFICIENCY: ICD-10-CM

## 2023-05-03 DIAGNOSIS — Z13.220 SCREENING CHOLESTEROL LEVEL: ICD-10-CM

## 2023-05-03 DIAGNOSIS — Z11.4 ENCOUNTER FOR SCREENING FOR HIV: ICD-10-CM

## 2023-05-03 DIAGNOSIS — Z11.59 ENCOUNTER FOR HEPATITIS C SCREENING TEST FOR LOW RISK PATIENT: ICD-10-CM

## 2023-05-03 DIAGNOSIS — M54.41 CHRONIC RIGHT-SIDED LOW BACK PAIN WITH RIGHT-SIDED SCIATICA: ICD-10-CM

## 2023-05-03 DIAGNOSIS — G89.29 CHRONIC RIGHT-SIDED LOW BACK PAIN WITH RIGHT-SIDED SCIATICA: ICD-10-CM

## 2023-05-03 DIAGNOSIS — Z00.00 ENCOUNTER FOR WELL ADULT EXAM WITHOUT ABNORMAL FINDINGS: ICD-10-CM

## 2023-05-03 DIAGNOSIS — Z90.710 S/P HYSTERECTOMY: ICD-10-CM

## 2023-05-03 DIAGNOSIS — Z01.818 PREOP EXAMINATION: ICD-10-CM

## 2023-05-03 DIAGNOSIS — Z00.00 ENCOUNTER FOR WELL ADULT EXAM WITHOUT ABNORMAL FINDINGS: Primary | ICD-10-CM

## 2023-05-03 DIAGNOSIS — R73.09 IMPAIRED GLUCOSE REGULATION: ICD-10-CM

## 2023-05-03 LAB
25(OH)D3 SERPL-MCNC: 24.2 NG/ML
ALBUMIN SERPL-MCNC: 4.4 G/DL (ref 3.4–5)
ALBUMIN/GLOB SERPL: 1.8 {RATIO} (ref 1.1–2.2)
ALP SERPL-CCNC: 50 U/L (ref 40–129)
ALT SERPL-CCNC: 14 U/L (ref 10–40)
ANION GAP SERPL CALCULATED.3IONS-SCNC: 13 MMOL/L (ref 3–16)
AST SERPL-CCNC: 18 U/L (ref 15–37)
BASOPHILS # BLD: 0 K/UL (ref 0–0.2)
BASOPHILS NFR BLD: 0.3 %
BILIRUB SERPL-MCNC: 0.4 MG/DL (ref 0–1)
BUN SERPL-MCNC: 18 MG/DL (ref 7–20)
CALCIUM SERPL-MCNC: 9.6 MG/DL (ref 8.3–10.6)
CHLORIDE SERPL-SCNC: 105 MMOL/L (ref 99–110)
CHOLEST SERPL-MCNC: 214 MG/DL (ref 0–199)
CO2 SERPL-SCNC: 24 MMOL/L (ref 21–32)
CREAT SERPL-MCNC: 1 MG/DL (ref 0.6–1.1)
DEPRECATED RDW RBC AUTO: 13.4 % (ref 12.4–15.4)
EOSINOPHIL # BLD: 0.1 K/UL (ref 0–0.6)
EOSINOPHIL NFR BLD: 2.8 %
FOLATE SERPL-MCNC: 14.94 NG/ML (ref 4.78–24.2)
FSH SERPL-ACNC: 5.6 MIU/ML
GFR SERPLBLD CREATININE-BSD FMLA CKD-EPI: >60 ML/MIN/{1.73_M2}
GLUCOSE SERPL-MCNC: 90 MG/DL (ref 70–99)
HCT VFR BLD AUTO: 41.8 % (ref 36–48)
HDLC SERPL-MCNC: 74 MG/DL (ref 40–60)
HGB BLD-MCNC: 14 G/DL (ref 12–16)
LDLC SERPL CALC-MCNC: 128 MG/DL
LH SERPL-ACNC: 9.8 MIU/ML
LYMPHOCYTES # BLD: 1 K/UL (ref 1–5.1)
LYMPHOCYTES NFR BLD: 20.3 %
MCH RBC QN AUTO: 30.3 PG (ref 26–34)
MCHC RBC AUTO-ENTMCNC: 33.4 G/DL (ref 31–36)
MCV RBC AUTO: 90.8 FL (ref 80–100)
MONOCYTES # BLD: 0.4 K/UL (ref 0–1.3)
MONOCYTES NFR BLD: 7.7 %
NEUTROPHILS # BLD: 3.5 K/UL (ref 1.7–7.7)
NEUTROPHILS NFR BLD: 68.9 %
PHOSPHATE SERPL-MCNC: 3.7 MG/DL (ref 2.5–4.9)
PLATELET # BLD AUTO: 271 K/UL (ref 135–450)
PMV BLD AUTO: 8.3 FL (ref 5–10.5)
POTASSIUM SERPL-SCNC: 4.1 MMOL/L (ref 3.5–5.1)
PROT SERPL-MCNC: 6.9 G/DL (ref 6.4–8.2)
RBC # BLD AUTO: 4.61 M/UL (ref 4–5.2)
SODIUM SERPL-SCNC: 142 MMOL/L (ref 136–145)
TRIGL SERPL-MCNC: 58 MG/DL (ref 0–150)
TSH SERPL DL<=0.005 MIU/L-ACNC: 0.75 UIU/ML (ref 0.27–4.2)
VIT B12 SERPL-MCNC: 996 PG/ML (ref 211–911)
VLDLC SERPL CALC-MCNC: 12 MG/DL
WBC # BLD AUTO: 5.1 K/UL (ref 4–11)

## 2023-05-03 PROCEDURE — 3074F SYST BP LT 130 MM HG: CPT | Performed by: INTERNAL MEDICINE

## 2023-05-03 PROCEDURE — 99396 PREV VISIT EST AGE 40-64: CPT | Performed by: INTERNAL MEDICINE

## 2023-05-03 PROCEDURE — 3078F DIAST BP <80 MM HG: CPT | Performed by: INTERNAL MEDICINE

## 2023-05-03 RX ORDER — NAPROXEN 500 MG/1
TABLET ORAL
Qty: 60 TABLET | Refills: 5 | Status: SHIPPED | OUTPATIENT
Start: 2023-05-03

## 2023-05-03 RX ORDER — BUDESONIDE AND FORMOTEROL FUMARATE DIHYDRATE 160; 4.5 UG/1; UG/1
2 AEROSOL RESPIRATORY (INHALATION) 2 TIMES DAILY
Qty: 1 EACH | Refills: 5 | Status: SHIPPED | OUTPATIENT
Start: 2023-05-03

## 2023-05-03 SDOH — ECONOMIC STABILITY: FOOD INSECURITY: WITHIN THE PAST 12 MONTHS, YOU WORRIED THAT YOUR FOOD WOULD RUN OUT BEFORE YOU GOT MONEY TO BUY MORE.: NEVER TRUE

## 2023-05-03 SDOH — ECONOMIC STABILITY: HOUSING INSECURITY
IN THE LAST 12 MONTHS, WAS THERE A TIME WHEN YOU DID NOT HAVE A STEADY PLACE TO SLEEP OR SLEPT IN A SHELTER (INCLUDING NOW)?: NO

## 2023-05-03 SDOH — ECONOMIC STABILITY: FOOD INSECURITY: WITHIN THE PAST 12 MONTHS, THE FOOD YOU BOUGHT JUST DIDN'T LAST AND YOU DIDN'T HAVE MONEY TO GET MORE.: NEVER TRUE

## 2023-05-03 SDOH — ECONOMIC STABILITY: INCOME INSECURITY: HOW HARD IS IT FOR YOU TO PAY FOR THE VERY BASICS LIKE FOOD, HOUSING, MEDICAL CARE, AND HEATING?: NOT HARD AT ALL

## 2023-05-03 ASSESSMENT — ENCOUNTER SYMPTOMS
BACK PAIN: 0
COUGH: 0
SINUS PRESSURE: 0
SHORTNESS OF BREATH: 0
CONSTIPATION: 0
EYE PAIN: 0
COLOR CHANGE: 0
TROUBLE SWALLOWING: 0
WHEEZING: 0
CHEST TIGHTNESS: 0
ABDOMINAL DISTENTION: 0
NAUSEA: 0
ABDOMINAL PAIN: 0
DIARRHEA: 0
EYE REDNESS: 0
VOMITING: 0
BLOOD IN STOOL: 0
SORE THROAT: 0

## 2023-05-03 NOTE — ASSESSMENT & PLAN NOTE
Stable and controlled  Continue medication as documented in your medication list  Symbicort 2 puffs twice daily

## 2023-05-03 NOTE — PROGRESS NOTES
ta[place] or an eligible witness and provide a signed copy to the practice office.   Time spent (minutes): 5 mins

## 2023-05-03 NOTE — PATIENT INSTRUCTIONS
rubber sheet (dental dam) for protection during oral sex. Latex gloves can keep your hands from touching blood, semen, or other body fluids that can carry infections. Remember that birth control methods such as diaphragms, IUDs, foams, and birth control pills do not stop you from getting STIs. Follow-up care is a key part of your treatment and safety. Be sure to make and go to all appointments, and call your doctor if you are having problems. Its also a good idea to know your test results and keep a list of the medicines you take. How can you care for yourself at home? Think about getting shots to prevent hepatitis A and hepatitis B. These two diseases can be spread through sex. You also can get hepatitis A if you eat infected food. Use condoms or female condoms each time and every time you have sex. Learn the right way to use a male condom:  Condoms come in several sizes. Make sure you use the right size. A condom that is too small can break easily. A condom that is too big can slip off during sex. Use a new condom each time you have sex. Be careful not to poke a hole in the condom when you open the wrapper. Squeeze the tip of the condom to keep out air. Pull down the loose skin (foreskin) from the head of an uncircumcised penis. While squeezing the tip of the condom, unroll it all the way down to the base of the firm penis. Never use petroleum jelly (such as Vaseline), grease, hand lotion, baby oil, or anything with oil in it. These products can make holes in the condom. After sex, hold the condom on your penis as you remove your penis from your partner. This will keep semen from spilling out of the condom. Learn to use a female condom:  You can put in a female condom up to 8 hours before sex. Squeeze the smaller ring at the closed end and insert it deep into the vagina. The larger ring at the open end should stay outside the vagina. During sex, make sure the penis goes into the condom.   After the

## 2023-05-04 LAB
EST. AVERAGE GLUCOSE BLD GHB EST-MCNC: 111.2 MG/DL
HBA1C MFR BLD: 5.5 %

## 2023-05-04 NOTE — PROGRESS NOTES
The 10-year ASCVD risk score (Hayden RUDD, et al., 2019) is: 1.4%    Values used to calculate the score:      Age: 52 years      Sex: Female      Is Non- : Yes      Diabetic: No      Tobacco smoker: No      Systolic Blood Pressure: 364 mmHg      Is BP treated: Yes      HDL Cholesterol: 74 mg/dL      Total Cholesterol: 214 mg/dL

## 2023-06-14 PROBLEM — R94.8: Status: ACTIVE | Noted: 2023-06-14

## 2023-07-24 DIAGNOSIS — G89.29 CHRONIC RIGHT-SIDED LOW BACK PAIN WITH RIGHT-SIDED SCIATICA: ICD-10-CM

## 2023-07-24 DIAGNOSIS — M54.41 CHRONIC RIGHT-SIDED LOW BACK PAIN WITH RIGHT-SIDED SCIATICA: ICD-10-CM

## 2023-07-24 DIAGNOSIS — J45.40 MODERATE PERSISTENT ASTHMA WITHOUT COMPLICATION: Primary | ICD-10-CM

## 2023-10-02 DIAGNOSIS — Z12.11 COLON CANCER SCREENING: ICD-10-CM

## 2023-10-02 DIAGNOSIS — Z12.31 ENCOUNTER FOR SCREENING MAMMOGRAM FOR MALIGNANT NEOPLASM OF BREAST: Primary | ICD-10-CM

## 2023-10-03 ENCOUNTER — TELEPHONE (OUTPATIENT)
Dept: PRIMARY CARE CLINIC | Age: 48
End: 2023-10-03

## 2023-10-03 NOTE — TELEPHONE ENCOUNTER
I spoke with Blake and she would like to be removed from any calls regarding any test or researches. She doesn't want to receive any reminder calls.

## 2023-11-10 ENCOUNTER — TELEPHONE (OUTPATIENT)
Dept: PULMONOLOGY | Age: 48
End: 2023-11-10

## 2023-11-10 ENCOUNTER — HOSPITAL ENCOUNTER (OUTPATIENT)
Dept: GENERAL RADIOLOGY | Age: 48
Discharge: HOME | End: 2023-11-10
Payer: COMMERCIAL

## 2023-11-10 ENCOUNTER — HOSPITAL ENCOUNTER (OUTPATIENT)
Age: 48
Discharge: HOME | End: 2023-11-10
Payer: COMMERCIAL

## 2023-11-10 ENCOUNTER — OFFICE VISIT (OUTPATIENT)
Dept: PULMONOLOGY | Age: 48
End: 2023-11-10
Payer: COMMERCIAL

## 2023-11-10 DIAGNOSIS — J45.41 MODERATE PERSISTENT ASTHMA WITH ACUTE EXACERBATION: ICD-10-CM

## 2023-11-10 DIAGNOSIS — R09.1 PLEURISY: Primary | ICD-10-CM

## 2023-11-10 DIAGNOSIS — J45.20 MILD INTERMITTENT ASTHMA WITHOUT COMPLICATION: ICD-10-CM

## 2023-11-10 DIAGNOSIS — I10 HTN (HYPERTENSION), BENIGN: ICD-10-CM

## 2023-11-10 LAB
DEPRECATED RDW RBC AUTO: 12.6 % (ref 12.4–15.4)
ESTRADIOL SERPL-MCNC: 17 PG/ML
FSH SERPL-ACNC: 9.7 MIU/ML
HCT VFR BLD AUTO: 44.6 % (ref 36–48)
HGB BLD-MCNC: 15 G/DL (ref 12–16)
MCH RBC QN AUTO: 30.5 PG (ref 26–34)
MCHC RBC AUTO-ENTMCNC: 33.6 G/DL (ref 31–36)
MCV RBC AUTO: 90.8 FL (ref 80–100)
PLATELET # BLD AUTO: 298 K/UL (ref 135–450)
PMV BLD AUTO: 8.6 FL (ref 5–10.5)
RBC # BLD AUTO: 4.91 M/UL (ref 4–5.2)
WBC # BLD AUTO: 7 K/UL (ref 4–11)

## 2023-11-10 PROCEDURE — 71046 X-RAY EXAM CHEST 2 VIEWS: CPT

## 2023-11-10 PROCEDURE — 83001 ASSAY OF GONADOTROPIN (FSH): CPT

## 2023-11-10 PROCEDURE — 85027 COMPLETE CBC AUTOMATED: CPT

## 2023-11-10 PROCEDURE — 82670 ASSAY OF TOTAL ESTRADIOL: CPT

## 2023-11-10 PROCEDURE — 36415 COLL VENOUS BLD VENIPUNCTURE: CPT

## 2023-11-10 PROCEDURE — 99214 OFFICE O/P EST MOD 30 MIN: CPT | Performed by: INTERNAL MEDICINE

## 2023-11-10 RX ORDER — PREDNISONE 10 MG/1
TABLET ORAL
Qty: 30 TABLET | Refills: 0 | Status: SHIPPED | OUTPATIENT
Start: 2023-11-10 | End: 2023-11-20

## 2023-11-10 RX ORDER — ALBUTEROL SULFATE 90 UG/1
2 AEROSOL, METERED RESPIRATORY (INHALATION) EVERY 6 HOURS PRN
Qty: 1 EACH | Refills: 5 | Status: SHIPPED | OUTPATIENT
Start: 2023-11-10

## 2023-11-10 RX ORDER — HYDROCHLOROTHIAZIDE 12.5 MG/1
12.5 CAPSULE, GELATIN COATED ORAL EVERY MORNING
Qty: 90 CAPSULE | Refills: 1 | OUTPATIENT
Start: 2023-11-10

## 2023-11-10 RX ORDER — HYDROCHLOROTHIAZIDE 12.5 MG/1
12.5 CAPSULE, GELATIN COATED ORAL EVERY MORNING
Qty: 90 CAPSULE | Refills: 1 | Status: SHIPPED | OUTPATIENT
Start: 2023-11-10

## 2023-11-10 NOTE — TELEPHONE ENCOUNTER
[facility] called and dr Brenden Macias wanted to speak with  regarding patients chest x-ray.      [id number] [address]: [phone]

## 2023-11-10 NOTE — PROGRESS NOTES
[facility]  Follow Up Note  Liza Vernon MD       [facility] Record   YOB: 1975    Date of Visit:  11/10/2023    Assessment/Plan:  1. Pleurisy  No rub  Clear chest    Pred taper  I don;t think she needs Abx at this time    2. Moderate persistent asthma without complication  Asthma a bit flared up because of this  Prednisone should help with that  Continue Symbicort and albuterol, refills given  - XR CHEST (2 VW); Future      No chief complaint on file. HPI  The patient presents with a chief complaint of pleuritic right-sided chest pain. She notices this somewhat in the from but mostly under her arm. Its been going on for about oneo month. She really has not had much in the way of associated cough or mucus. No fevers or chills reported. She has been taking her Symbicort and Ventolin but feels like it has not worked quite as much. She had similar flare up about a year ago and responded to Prednisone. .    Review of Systems  As reviewed in HPI    History  I have reviewed past medical, surgical, social and family history. This is documented elsewhere in the medical record. Physical Exam:  Well developed, well nourished  Alert and oriented  Sclera is clear  No cervical adenopathy  No JVD. Chest examination is some squeaks and coarse breath sounds in the right middle lobe which could be consistent with pleurisy. .  Cardiac examination reveals regular rate and rhythm without murmur, gallop or rub. The abdomen is soft, nontender and nondistended. There is no clubbing, cyanosis or edema of the extremities. There is no obvious skin rash. No focal neuro deficicts  Normal mood and affect    No Known Allergies  Prior to Visit Medications    Medication Sig Taking? Authorizing Provider   predniSONE (DELTASONE) 10 MG tablet Take 40 mg by mouth for 3 days 30 mg for 3 days 20 mg for 3 days 10 mg for 3 days.  Yes Liza Vernon MD   albuterol

## 2023-11-16 LAB — TESTOST SERPL-MCNC: 113 NG/DL (ref 20–70)

## 2023-12-11 ENCOUNTER — OFFICE VISIT (OUTPATIENT)
Dept: PULMONOLOGY | Age: 48
End: 2023-12-11
Payer: COMMERCIAL

## 2023-12-11 VITALS
DIASTOLIC BLOOD PRESSURE: 82 MMHG | HEIGHT: 70 IN | WEIGHT: 189 LBS | SYSTOLIC BLOOD PRESSURE: 128 MMHG | OXYGEN SATURATION: 98 % | HEART RATE: 81 BPM | BODY MASS INDEX: 27.06 KG/M2

## 2023-12-11 DIAGNOSIS — J93.81 CHRONIC PNEUMOTHORAX: ICD-10-CM

## 2023-12-11 DIAGNOSIS — R09.1 PLEURISY: Primary | ICD-10-CM

## 2023-12-11 PROCEDURE — 3079F DIAST BP 80-89 MM HG: CPT | Performed by: INTERNAL MEDICINE

## 2023-12-11 PROCEDURE — 3074F SYST BP LT 130 MM HG: CPT | Performed by: INTERNAL MEDICINE

## 2023-12-11 PROCEDURE — 99214 OFFICE O/P EST MOD 30 MIN: CPT | Performed by: INTERNAL MEDICINE

## 2024-01-02 ENCOUNTER — HOSPITAL ENCOUNTER (OUTPATIENT)
Dept: CT IMAGING | Age: 49
Discharge: HOME | End: 2024-01-02
Attending: INTERNAL MEDICINE
Payer: COMMERCIAL

## 2024-01-02 DIAGNOSIS — J93.81 CHRONIC PNEUMOTHORAX: ICD-10-CM

## 2024-01-02 DIAGNOSIS — R09.1 PLEURISY: ICD-10-CM

## 2024-01-02 PROCEDURE — 71250 CT THORAX DX C-: CPT

## 2024-01-08 ENCOUNTER — PATIENT MESSAGE (OUTPATIENT)
Dept: PULMONOLOGY | Age: 49
End: 2024-01-08

## 2024-01-12 NOTE — TELEPHONE ENCOUNTER
Called and spoke with patient.    Advised her of physician message she verbally understood.    No further assistance at this time.

## 2024-01-12 NOTE — TELEPHONE ENCOUNTER
From: Disha Stovall  To: Dr. Giuliano Child  Sent: 1/8/2024 3:22 PM EST  Subject: Question about CT scan    Hi-    I had to get on a work call when the nurse called. I had more questions about the pain I’m having on my right side. I understand my scan is the same as 2016. Is it scar tissue from my two lung surgeries, the function of my lung or something else? I really don’t want to get on prednisone again or take pain meds. If it’s scar tissue I’ll just have to learn to deal with it. I just need an answer to what it could be because something has changed.     Thanks

## 2024-02-12 DIAGNOSIS — Z12.11 COLON CANCER SCREENING: Primary | ICD-10-CM

## 2024-02-21 ENCOUNTER — HOSPITAL ENCOUNTER (EMERGENCY)
Age: 49
Discharge: HOME | End: 2024-02-21
Payer: COMMERCIAL

## 2024-02-21 VITALS
BODY MASS INDEX: 27.26 KG/M2 | DIASTOLIC BLOOD PRESSURE: 100 MMHG | OXYGEN SATURATION: 99 % | TEMPERATURE: 97 F | SYSTOLIC BLOOD PRESSURE: 153 MMHG | WEIGHT: 190 LBS | HEART RATE: 85 BPM | RESPIRATION RATE: 20 BRPM

## 2024-02-21 DIAGNOSIS — K04.7 DENTAL ABSCESS: Primary | ICD-10-CM

## 2024-02-21 PROCEDURE — 6370000000 HC RX 637 (ALT 250 FOR IP): Performed by: PHYSICIAN ASSISTANT

## 2024-02-21 PROCEDURE — 99283 EMERGENCY DEPT VISIT LOW MDM: CPT

## 2024-02-21 RX ORDER — CLINDAMYCIN HYDROCHLORIDE 300 MG/1
300 CAPSULE ORAL 3 TIMES DAILY
Qty: 21 CAPSULE | Refills: 0 | Status: SHIPPED | OUTPATIENT
Start: 2024-02-21 | End: 2024-02-28

## 2024-02-21 RX ORDER — CLINDAMYCIN HYDROCHLORIDE 150 MG/1
300 CAPSULE ORAL ONCE
Status: COMPLETED | OUTPATIENT
Start: 2024-02-21 | End: 2024-02-21

## 2024-02-21 RX ORDER — IBUPROFEN 800 MG/1
800 TABLET ORAL EVERY 8 HOURS PRN
Qty: 30 TABLET | Refills: 0 | Status: SHIPPED | OUTPATIENT
Start: 2024-02-21

## 2024-02-21 RX ADMIN — CLINDAMYCIN HYDROCHLORIDE 300 MG: 150 CAPSULE ORAL at 21:47

## 2024-02-21 ASSESSMENT — PAIN - FUNCTIONAL ASSESSMENT: PAIN_FUNCTIONAL_ASSESSMENT: 0-10

## 2024-02-21 ASSESSMENT — PAIN DESCRIPTION - ORIENTATION: ORIENTATION: RIGHT

## 2024-02-21 ASSESSMENT — PAIN SCALES - GENERAL: PAINLEVEL_OUTOF10: 10

## 2024-02-21 ASSESSMENT — PAIN DESCRIPTION - LOCATION: LOCATION: FACE;MOUTH

## 2024-02-21 ASSESSMENT — PAIN DESCRIPTION - FREQUENCY: FREQUENCY: CONTINUOUS

## 2024-02-21 ASSESSMENT — PAIN DESCRIPTION - ONSET: ONSET: PROGRESSIVE

## 2024-02-22 NOTE — ED PROVIDER NOTES
[facility] EMERGENCY DEPARTMENT  EMERGENCY DEPARTMENT ENCOUNTER        Pt Name: Disha Stovall  MRN: [id number]  Birthdate 1975  Date of evaluation: 2/21/2024  Provider: Oliver Orellana PA-C  PCP: Aldo Pruitt MD  Note Started: 9:51 PM EST 2/21/24      NERY. I have evaluated this patient.        CHIEF COMPLAINT       Chief Complaint   Patient presents with    Oral Swelling     Pt has swelling from dental pain on right side of face. Pt states that she took 1 dose of amoxicillin and 1 dose of aleve prior to coming to ED.        HISTORY OF PRESENT ILLNESS: 1 or more Elements     History From: patient    Disha Stovall is a 48 y.o. female who presents complaining of right-sided facial swelling and dental pain x 2 days.  Patient reports she has had mild right upper dental pain for \"a while \".  But it started having increasing pain and swelling for the last 2 days.  She has some leftover amoxicillin that she took this morning without relief.  Denies trauma, recent dental work, fever, eye pain, vision vision change, sore throat, difficulty swallowing.    Nursing Notes were all reviewed and agreed with or any disagreements were addressed in the HPI.    REVIEW OF SYSTEMS :      Review of Systems   All other systems reviewed and are negative.      Positives and Pertinent negatives as per HPI.       PAST MEDICAL HISTORY    has a past medical history of Allergic rhinitis, Collapse of lung, Collapsed lung, Endometriosis, HTN (hypertension), benign (9/17/2020), and Pneumothorax (2006 & 2008).     SURGICAL HISTORY     Past Surgical History:   Procedure Laterality Date    CHEST TUBE INSERTION Right     2 times    LAPAROSCOPY  1998       CURRENTMEDICATIONS       Previous Medications    ALBUTEROL SULFATE HFA (VENTOLIN HFA) 108 (90 BASE) MCG/ACT INHALER    Inhale 2 puffs into the lungs every 6 hours as needed for Wheezing    BUDESONIDE-FORMOTEROL (SYMBICORT) 160-4.5 MCG/ACT AERO    Inhale 2

## 2024-03-14 LAB — NONINV COLON CA DNA+OCC BLD SCRN STL QL: NEGATIVE

## 2024-03-30 SDOH — HEALTH STABILITY: PHYSICAL HEALTH: ON AVERAGE, HOW MANY DAYS PER WEEK DO YOU ENGAGE IN MODERATE TO STRENUOUS EXERCISE (LIKE A BRISK WALK)?: 5 DAYS

## 2024-03-30 SDOH — HEALTH STABILITY: PHYSICAL HEALTH: ON AVERAGE, HOW MANY MINUTES DO YOU ENGAGE IN EXERCISE AT THIS LEVEL?: 40 MIN

## 2024-04-02 ENCOUNTER — OFFICE VISIT (OUTPATIENT)
Dept: INTERNAL MEDICINE CLINIC | Age: 49
End: 2024-04-02
Payer: COMMERCIAL

## 2024-04-02 VITALS
OXYGEN SATURATION: 98 % | WEIGHT: 197 LBS | HEART RATE: 87 BPM | HEIGHT: 70 IN | DIASTOLIC BLOOD PRESSURE: 82 MMHG | BODY MASS INDEX: 28.2 KG/M2 | SYSTOLIC BLOOD PRESSURE: 140 MMHG

## 2024-04-02 DIAGNOSIS — R10.12 LEFT UPPER QUADRANT PAIN: Primary | ICD-10-CM

## 2024-04-02 DIAGNOSIS — E66.3 OVERWEIGHT WITH BODY MASS INDEX (BMI) OF 28 TO 28.9 IN ADULT: ICD-10-CM

## 2024-04-02 DIAGNOSIS — I10 HTN (HYPERTENSION), BENIGN: ICD-10-CM

## 2024-04-02 DIAGNOSIS — E88.819 INSULIN RESISTANCE: ICD-10-CM

## 2024-04-02 DIAGNOSIS — Z00.00 ANNUAL PHYSICAL EXAM: ICD-10-CM

## 2024-04-02 DIAGNOSIS — J93.81 CHRONIC PNEUMOTHORAX: ICD-10-CM

## 2024-04-02 PROBLEM — J40 BRONCHITIS: Status: RESOLVED | Noted: 2018-12-27 | Resolved: 2024-04-02

## 2024-04-02 PROBLEM — J45.20 MILD INTERMITTENT ASTHMA WITHOUT COMPLICATION: Status: RESOLVED | Noted: 2018-12-27 | Resolved: 2024-04-02

## 2024-04-02 PROBLEM — D25.9 UTERINE LEIOMYOMA: Status: RESOLVED | Noted: 2018-06-20 | Resolved: 2024-04-02

## 2024-04-02 PROBLEM — G89.29 CHRONIC RIGHT-SIDED LOW BACK PAIN WITH RIGHT-SIDED SCIATICA: Status: RESOLVED | Noted: 2021-07-29 | Resolved: 2024-04-02

## 2024-04-02 PROBLEM — R53.82 CHRONIC FATIGUE: Status: RESOLVED | Noted: 2021-07-29 | Resolved: 2024-04-02

## 2024-04-02 PROBLEM — R06.83 SNORES: Status: RESOLVED | Noted: 2021-07-29 | Resolved: 2024-04-02

## 2024-04-02 PROBLEM — M54.12 CERVICAL RADICULOPATHY: Status: ACTIVE | Noted: 2019-10-10

## 2024-04-02 PROBLEM — R94.8: Status: RESOLVED | Noted: 2023-06-14 | Resolved: 2024-04-02

## 2024-04-02 PROBLEM — M47.814 SPONDYLOSIS OF THORACIC SPINE: Status: ACTIVE | Noted: 2019-10-10

## 2024-04-02 PROBLEM — G47.33 OSA (OBSTRUCTIVE SLEEP APNEA): Status: RESOLVED | Noted: 2022-06-01 | Resolved: 2024-04-02

## 2024-04-02 PROBLEM — M54.41 CHRONIC RIGHT-SIDED LOW BACK PAIN WITH RIGHT-SIDED SCIATICA: Status: RESOLVED | Noted: 2021-07-29 | Resolved: 2024-04-02

## 2024-04-02 LAB
ALBUMIN SERPL-MCNC: 4.3 G/DL (ref 3.4–5)
ALBUMIN/GLOB SERPL: 1.6 {RATIO} (ref 1.1–2.2)
ALP SERPL-CCNC: 63 U/L (ref 40–129)
ALT SERPL-CCNC: 11 U/L (ref 10–40)
ANION GAP SERPL CALCULATED.3IONS-SCNC: 11 MMOL/L (ref 3–16)
AST SERPL-CCNC: 16 U/L (ref 15–37)
BILIRUB SERPL-MCNC: <0.2 MG/DL (ref 0–1)
BUN SERPL-MCNC: 14 MG/DL (ref 7–20)
CALCIUM SERPL-MCNC: 9.4 MG/DL (ref 8.3–10.6)
CHLORIDE SERPL-SCNC: 100 MMOL/L (ref 99–110)
CHOLEST SERPL-MCNC: 211 MG/DL (ref 0–199)
CO2 SERPL-SCNC: 26 MMOL/L (ref 21–32)
CREAT SERPL-MCNC: 0.8 MG/DL (ref 0.6–1.1)
CREAT UR-MCNC: 37.1 MG/DL (ref 28–259)
DEPRECATED RDW RBC AUTO: 12.9 % (ref 12.4–15.4)
GFR SERPLBLD CREATININE-BSD FMLA CKD-EPI: >90 ML/MIN/{1.73_M2}
GLUCOSE SERPL-MCNC: 85 MG/DL (ref 70–99)
HCT VFR BLD AUTO: 40.4 % (ref 36–48)
HDLC SERPL-MCNC: 70 MG/DL (ref 40–60)
HGB BLD-MCNC: 13.9 G/DL (ref 12–16)
LDLC SERPL CALC-MCNC: 116 MG/DL
MCH RBC QN AUTO: 30.8 PG (ref 26–34)
MCHC RBC AUTO-ENTMCNC: 34.5 G/DL (ref 31–36)
MCV RBC AUTO: 89.4 FL (ref 80–100)
MICROALBUMIN UR DL<=1MG/L-MCNC: <1.2 MG/DL
MICROALBUMIN/CREAT UR: NORMAL MG/G (ref 0–30)
PLATELET # BLD AUTO: 299 K/UL (ref 135–450)
PMV BLD AUTO: 8 FL (ref 5–10.5)
POTASSIUM SERPL-SCNC: 4 MMOL/L (ref 3.5–5.1)
PROT SERPL-MCNC: 7 G/DL (ref 6.4–8.2)
RBC # BLD AUTO: 4.52 M/UL (ref 4–5.2)
SODIUM SERPL-SCNC: 137 MMOL/L (ref 136–145)
TRIGL SERPL-MCNC: 124 MG/DL (ref 0–150)
TSH SERPL DL<=0.005 MIU/L-ACNC: 2.17 UIU/ML (ref 0.27–4.2)
VLDLC SERPL CALC-MCNC: 25 MG/DL
WBC # BLD AUTO: 9.8 K/UL (ref 4–11)

## 2024-04-02 PROCEDURE — 3077F SYST BP >= 140 MM HG: CPT | Performed by: INTERNAL MEDICINE

## 2024-04-02 PROCEDURE — 3079F DIAST BP 80-89 MM HG: CPT | Performed by: INTERNAL MEDICINE

## 2024-04-02 PROCEDURE — 99214 OFFICE O/P EST MOD 30 MIN: CPT | Performed by: INTERNAL MEDICINE

## 2024-04-02 PROCEDURE — 99396 PREV VISIT EST AGE 40-64: CPT | Performed by: INTERNAL MEDICINE

## 2024-04-02 RX ORDER — METFORMIN HYDROCHLORIDE 500 MG/1
500 TABLET, EXTENDED RELEASE ORAL
COMMUNITY

## 2024-04-02 RX ORDER — HYDROCHLOROTHIAZIDE 25 MG/1
25 TABLET ORAL EVERY MORNING
Qty: 90 TABLET | Refills: 1 | Status: SHIPPED | OUTPATIENT
Start: 2024-04-02

## 2024-04-02 ASSESSMENT — PATIENT HEALTH QUESTIONNAIRE - PHQ9
SUM OF ALL RESPONSES TO PHQ QUESTIONS 1-9: 0
1. LITTLE INTEREST OR PLEASURE IN DOING THINGS: NOT AT ALL
SUM OF ALL RESPONSES TO PHQ9 QUESTIONS 1 & 2: 0
SUM OF ALL RESPONSES TO PHQ QUESTIONS 1-9: 0
2. FEELING DOWN, DEPRESSED OR HOPELESS: NOT AT ALL

## 2024-04-02 ASSESSMENT — ENCOUNTER SYMPTOMS
NAUSEA: 0
CONSTIPATION: 0
VOMITING: 0
BACK PAIN: 0
SHORTNESS OF BREATH: 0
COLOR CHANGE: 0
SORE THROAT: 0
WHEEZING: 0
CHEST TIGHTNESS: 0
ABDOMINAL PAIN: 0
COUGH: 0

## 2024-04-02 NOTE — ASSESSMENT & PLAN NOTE
age-related health maintenance and immunization recommendations reviewed and discussed with patient, she declined updating Tdap  Labs ordered, healthy diet and regular exercise recommendations reinforced, patient still with concerns about BMI, has been following up with weight loss clinic in [place] however currently off semaglutide and only on metformin  Patient is up-to-date with mammography that was completed recently  Patient is status post hysterectomy, she is current with her GYN exam and will schedule follow-up  Recommendations for colonoscopy made to patient, she recently completed Cologuard however explained to patient colonoscopy is the superior standard test for colon cancer screening and would still recommend she completes that  Depression screen is negative

## 2024-04-02 NOTE — ASSESSMENT & PLAN NOTE
will update labs, has been maintained on metformin which she reports did improve her hemoglobin A1c along with semaglutide.  She does have strong family history of diabetes with both parents and a sibling with type 2 diabetes

## 2024-04-02 NOTE — ASSESSMENT & PLAN NOTE
blood pressure checked twice remained elevated although recheck is slightly better than initial reading.  Patient on hydrochlorothiazide 12.5 mg, has been working hard for weight loss and lifestyle modification changes because does not want to be on blood pressure medications, I did explain to patient with essential or primary hypertension sometimes medication is still needed despite healthy lifestyle and healthy diet.  Encouraged to continue attempts to maintain healthy low-salt low-fat low-carb diet with active lifestyle and regular exercise, recommended she starts a blood pressure log at home for the next 2 weeks and send the readings through [facility], will increase hydrochlorothiazide to 25 mg instead of 12.5 mg, update lab work and will make further recommendations pending results and ambulatory log

## 2024-04-02 NOTE — ASSESSMENT & PLAN NOTE
stable, using Symbicort occasionally and not every day, advised to use maintenance inhaler daily and add albuterol for as needed, she will continue to follow-up with pulmonary.  Continue to avoid smoke and known irritants

## 2024-04-02 NOTE — PROGRESS NOTES
ASSESSMENT/PLAN:  1. Left upper quadrant pain  Assessment & Plan:    none on today's exam, there does not appear to be any red flag symptoms, pain is chronic and has been ongoing for over 2 years, recommend she completes colonoscopy to rule out diverticular disease, if colonoscopy is negative can consider imaging  2. Annual physical exam  Assessment & Plan:    age-related health maintenance and immunization recommendations reviewed and discussed with patient, she declined updating Tdap  Labs ordered, healthy diet and regular exercise recommendations reinforced, patient still with concerns about BMI, has been following up with weight loss clinic in [place] however currently off semaglutide and only on metformin  Patient is up-to-date with mammography that was completed recently  Patient is status post hysterectomy, she is current with her GYN exam and will schedule follow-up  Recommendations for colonoscopy made to patient, she recently completed Cologuard however explained to patient colonoscopy is the superior standard test for colon cancer screening and would still recommend she completes that  Depression screen is negative  Orders:  -     CBC; Future  -     Comprehensive Metabolic Panel; Future  -     Lipid Panel; Future  -     Microalbumin / Creatinine Urine Ratio; Future  -     Hemoglobin A1C; Future  -     TSH with Reflex to FT4; Future  -     AFL - Jesus Jones MD, Gastroenterology, [facility]  3. Insulin resistance  Assessment & Plan:    will update labs, has been maintained on metformin which she reports did improve her hemoglobin A1c along with semaglutide.  She does have strong family history of diabetes with both parents and a sibling with type 2 diabetes  4. Overweight with body mass index (BMI) of 28 to 28.9 in adult  5. Chronic pneumothorax  Assessment & Plan:    stable, using Symbicort occasionally and not every day, advised to use maintenance inhaler daily and add albuterol for as

## 2024-04-02 NOTE — ASSESSMENT & PLAN NOTE
none on today's exam, there does not appear to be any red flag symptoms, pain is chronic and has been ongoing for over 2 years, recommend she completes colonoscopy to rule out diverticular disease, if colonoscopy is negative can consider imaging

## 2024-04-03 LAB
EST. AVERAGE GLUCOSE BLD GHB EST-MCNC: 102.5 MG/DL
HBA1C MFR BLD: 5.2 %

## 2024-04-03 NOTE — PROGRESS NOTES
Please advise patient lab results are okay, cholesterol is slightly higher than target goal but she has excellent HDL, continue healthy diet and active lifestyle

## 2024-04-04 ENCOUNTER — PATIENT MESSAGE (OUTPATIENT)
Dept: INTERNAL MEDICINE CLINIC | Age: 49
End: 2024-04-04

## 2024-04-04 NOTE — TELEPHONE ENCOUNTER
From: Disha Stovall  To: Dr. Stephani Valdes  Sent: 4/4/2024 1:33 PM EDT  Subject: Test results    Hello-    How often do I need to send my blood pressure numbers via [employer] and when will I hear about my bloodwork results?     Zack

## 2024-04-05 NOTE — TELEPHONE ENCOUNTER
Her lab results were okay, LDL cholesterol slightly higher than target goal but she has excellent HDL.  No need for cholesterol medication, continue attempts to adhere to healthy diet and active lifestyle

## 2024-05-14 SDOH — ECONOMIC STABILITY: FOOD INSECURITY: WITHIN THE PAST 12 MONTHS, THE FOOD YOU BOUGHT JUST DIDN'T LAST AND YOU DIDN'T HAVE MONEY TO GET MORE.: PATIENT DECLINED

## 2024-05-14 SDOH — ECONOMIC STABILITY: TRANSPORTATION INSECURITY
IN THE PAST 12 MONTHS, HAS LACK OF TRANSPORTATION KEPT YOU FROM MEETINGS, WORK, OR FROM GETTING THINGS NEEDED FOR DAILY LIVING?: PATIENT DECLINED

## 2024-05-14 SDOH — ECONOMIC STABILITY: HOUSING INSECURITY
IN THE LAST 12 MONTHS, WAS THERE A TIME WHEN YOU DID NOT HAVE A STEADY PLACE TO SLEEP OR SLEPT IN A SHELTER (INCLUDING NOW)?: PATIENT DECLINED

## 2024-05-14 SDOH — ECONOMIC STABILITY: FOOD INSECURITY: WITHIN THE PAST 12 MONTHS, YOU WORRIED THAT YOUR FOOD WOULD RUN OUT BEFORE YOU GOT MONEY TO BUY MORE.: PATIENT DECLINED

## 2024-05-14 SDOH — ECONOMIC STABILITY: INCOME INSECURITY: HOW HARD IS IT FOR YOU TO PAY FOR THE VERY BASICS LIKE FOOD, HOUSING, MEDICAL CARE, AND HEATING?: PATIENT DECLINED

## 2024-05-15 ENCOUNTER — OFFICE VISIT (OUTPATIENT)
Dept: INTERNAL MEDICINE CLINIC | Age: 49
End: 2024-05-15
Payer: COMMERCIAL

## 2024-05-15 VITALS
HEART RATE: 79 BPM | WEIGHT: 197.8 LBS | OXYGEN SATURATION: 100 % | DIASTOLIC BLOOD PRESSURE: 88 MMHG | SYSTOLIC BLOOD PRESSURE: 120 MMHG | BODY MASS INDEX: 28.38 KG/M2

## 2024-05-15 DIAGNOSIS — E66.3 OVERWEIGHT WITH BODY MASS INDEX (BMI) OF 28 TO 28.9 IN ADULT: ICD-10-CM

## 2024-05-15 DIAGNOSIS — E88.819 INSULIN RESISTANCE: ICD-10-CM

## 2024-05-15 DIAGNOSIS — Z83.3 FAMILY HISTORY OF DIABETES MELLITUS: ICD-10-CM

## 2024-05-15 DIAGNOSIS — I10 HTN (HYPERTENSION), BENIGN: Primary | ICD-10-CM

## 2024-05-15 PROBLEM — Z87.09 HISTORY OF PNEUMOTHORAX: Status: RESOLVED | Noted: 2018-12-27 | Resolved: 2024-05-15

## 2024-05-15 PROBLEM — R10.12 LEFT UPPER QUADRANT PAIN: Status: RESOLVED | Noted: 2024-04-02 | Resolved: 2024-05-15

## 2024-05-15 PROCEDURE — 3079F DIAST BP 80-89 MM HG: CPT | Performed by: INTERNAL MEDICINE

## 2024-05-15 PROCEDURE — 99214 OFFICE O/P EST MOD 30 MIN: CPT | Performed by: INTERNAL MEDICINE

## 2024-05-15 PROCEDURE — 3074F SYST BP LT 130 MM HG: CPT | Performed by: INTERNAL MEDICINE

## 2024-05-15 RX ORDER — METFORMIN HYDROCHLORIDE 500 MG/1
1000 TABLET, EXTENDED RELEASE ORAL
Qty: 180 TABLET | Refills: 1 | Status: SHIPPED | OUTPATIENT
Start: 2024-05-15

## 2024-05-15 ASSESSMENT — ENCOUNTER SYMPTOMS
COUGH: 0
BACK PAIN: 0
ABDOMINAL PAIN: 0
NAUSEA: 0
CHEST TIGHTNESS: 0
SORE THROAT: 0
CONSTIPATION: 0
COLOR CHANGE: 0
VOMITING: 0

## 2024-05-15 NOTE — PROGRESS NOTES
ASSESSMENT/PLAN:  1. HTN (hypertension), benign  Assessment & Plan:    Home blood pressure log consistent with systolic at target goal however borderline diastolic, similar readings in office today, patient did start exercising regularly again about a week ago, encouraged to continue same along with continuing attempts to maintain healthy low-salt diet.  Will continue current dose of hydrochlorothiazide and reevaluate in 3 months.  If remains with borderline diastolic then will switch to a different agent or switch to a combination of hydrochlorothiazide with low-dose ACE inhibitor or ARB agent.  2. Overweight with body mass index (BMI) of 28 to 28.9 in adult  Assessment & Plan:    patient reports weight has plateaued despite her doing the diet and exercise, she is no longer following up with the weight loss clinic in [place] and never followed through on the referral to [facility] weight management however she is requesting a refill on metformin which helped her feeling better although no significant weight loss with it.  Advised we will go ahead and refill metformin since patient does have known history of prediabetes along with a strong family history of diabetes, most recent hemoglobin A1c is at target goal.  Diet and lifestyle modification changes reinforced  Orders:  -     metFORMIN (GLUCOPHAGE-XR) 500 MG extended release tablet; Take 2 tablets by mouth daily (with breakfast), Disp-180 tablet, R-1Normal  3. Insulin resistance  Assessment & Plan:    as noted above will restart metformin along with lifestyle modification changes.  Orders:  -     metFORMIN (GLUCOPHAGE-XR) 500 MG extended release tablet; Take 2 tablets by mouth daily (with breakfast), Disp-180 tablet, R-1Normal  4. Family history of diabetes mellitus  -     metFORMIN (GLUCOPHAGE-XR) 500 MG extended release tablet; Take 2 tablets by mouth daily (with breakfast), Disp-180 tablet, R-1Normal      Return in about 3 months (around 8/15/2024).

## 2024-05-15 NOTE — ASSESSMENT & PLAN NOTE
patient reports weight has plateaued despite her doing the diet and exercise, she is no longer following up with the weight loss clinic in [place] and never followed through on the referral to [facility] weight management however she is requesting a refill on metformin which helped her feeling better although no significant weight loss with it.  Advised we will go ahead and refill metformin since patient does have known history of prediabetes along with a strong family history of diabetes, most recent hemoglobin A1c is at target goal.  Diet and lifestyle modification changes reinforced

## 2024-05-15 NOTE — ASSESSMENT & PLAN NOTE
Home blood pressure log consistent with systolic at target goal however borderline diastolic, similar readings in office today, patient did start exercising regularly again about a week ago, encouraged to continue same along with continuing attempts to maintain healthy low-salt diet.  Will continue current dose of hydrochlorothiazide and reevaluate in 3 months.  If remains with borderline diastolic then will switch to a different agent or switch to a combination of hydrochlorothiazide with low-dose ACE inhibitor or ARB agent.

## 2024-06-03 ENCOUNTER — PATIENT MESSAGE (OUTPATIENT)
Dept: INTERNAL MEDICINE CLINIC | Age: 49
End: 2024-06-03

## 2024-06-03 DIAGNOSIS — E88.819 INSULIN RESISTANCE: ICD-10-CM

## 2024-06-03 DIAGNOSIS — R94.6 THYROID FUNCTION STUDY ABNORMALITY: Primary | ICD-10-CM

## 2024-06-04 PROBLEM — R94.6 THYROID FUNCTION STUDY ABNORMALITY: Status: ACTIVE | Noted: 2024-06-04

## 2024-06-04 NOTE — TELEPHONE ENCOUNTER
From: Disha Stvoall  To: Dr. Stephani Valdes  Sent: 6/3/2024 2:28 PM EDT  Subject: Thyroid    Hi-  Is there a way I can get a complete thyroid check? I’m talking about T3,TSH,T4,Antibodies, Ultrasound and x-ray? I think my T3 and T4 were low last year. My inability to lose weight & my high blood pressure has to be medical. I didn’t just wake up and start working out and eating healthy. 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 As an adult I’ve trained for marathons, 10k and 5ks and I’ve always been a healthy eater. I used to ride my mountain bike 20 miles daily. Now I ride my Peloton bike avg 12 miles per day. For the past 6 years I’ve been trying to figure out why I can’t lose weight. I work out 80 mins per day, 6 days a week( Peloton bike and weights) I weigh/track everything I eat in grams (Macros) The last couple of years I’ve dropped my calories to 1200, raised it to 1900(based off my Macros) and I’m at 1400 daily intake right now. I’ve been on every FDA weight loss medication, I’ve been to [facility] weight t clinic and seen a nutritionist. I track my daily intake of protein,   carbs, sugar, sodium, fiber & net carbs per guidelines in my [employer] monserrat. Since I saw you last I don’t eat processed meat, limit processed foods( almost every food in the [place] is processed) I eat fresh fruit( strawberries, blueberries, raspberries) I make green (veggies) smoothies and eat veggies with dinner. It doesn’t matter if I eat right or wrong, workout or not, I’m not losing weight or inches. I’ve heard “as you get older” but I’m not accepting that. I want to live a very long healthy life.

## 2024-06-04 NOTE — TELEPHONE ENCOUNTER
Please advise patient I believe her concerns are better addressed by consulting an endocrinologist, I did place a referral for her

## 2024-08-15 ENCOUNTER — OFFICE VISIT (OUTPATIENT)
Dept: INTERNAL MEDICINE CLINIC | Age: 49
End: 2024-08-15
Payer: COMMERCIAL

## 2024-08-15 VITALS
OXYGEN SATURATION: 98 % | BODY MASS INDEX: 27.46 KG/M2 | HEART RATE: 72 BPM | WEIGHT: 191.4 LBS | SYSTOLIC BLOOD PRESSURE: 114 MMHG | DIASTOLIC BLOOD PRESSURE: 78 MMHG

## 2024-08-15 DIAGNOSIS — M25.562 ACUTE PAIN OF LEFT KNEE: ICD-10-CM

## 2024-08-15 DIAGNOSIS — I10 HTN (HYPERTENSION), BENIGN: Primary | ICD-10-CM

## 2024-08-15 DIAGNOSIS — J93.81 CHRONIC PNEUMOTHORAX: ICD-10-CM

## 2024-08-15 DIAGNOSIS — E88.819 INSULIN RESISTANCE: ICD-10-CM

## 2024-08-15 PROBLEM — R09.1 PLEURISY: Status: RESOLVED | Noted: 2022-05-13 | Resolved: 2024-08-15

## 2024-08-15 PROCEDURE — 3078F DIAST BP <80 MM HG: CPT | Performed by: INTERNAL MEDICINE

## 2024-08-15 PROCEDURE — 3074F SYST BP LT 130 MM HG: CPT | Performed by: INTERNAL MEDICINE

## 2024-08-15 PROCEDURE — 99214 OFFICE O/P EST MOD 30 MIN: CPT | Performed by: INTERNAL MEDICINE

## 2024-08-15 RX ORDER — HYDROCHLOROTHIAZIDE 25 MG/1
25 TABLET ORAL EVERY MORNING
Qty: 90 TABLET | Refills: 1 | Status: SHIPPED | OUTPATIENT
Start: 2024-08-15

## 2024-08-15 ASSESSMENT — ENCOUNTER SYMPTOMS
ABDOMINAL PAIN: 0
SHORTNESS OF BREATH: 1
CHEST TIGHTNESS: 0
NAUSEA: 0
BACK PAIN: 0
CONSTIPATION: 0
VOMITING: 0
COLOR CHANGE: 0
COUGH: 0
WHEEZING: 0
SORE THROAT: 0

## 2024-08-15 NOTE — PROGRESS NOTES
jogging and exercising.  Blood pressure has been doing well and both in office reading today and endocrinology office visit reading were at target goal        Review of Systems   Constitutional:  Negative for activity change, appetite change and fatigue.   HENT:  Negative for congestion, hearing loss, mouth sores and sore throat.    Eyes:  Negative for visual disturbance.   Respiratory:  Positive for shortness of breath. Negative for cough, chest tightness and wheezing.    Cardiovascular:  Negative for chest pain, palpitations and leg swelling.   Gastrointestinal:  Negative for abdominal pain, constipation, nausea and vomiting.   Genitourinary:  Negative for difficulty urinating, dysuria, frequency, hematuria and urgency.   Musculoskeletal:  Positive for arthralgias. Negative for back pain, gait problem and joint swelling.   Skin:  Negative for color change.   Allergic/Immunologic: Negative for environmental allergies and immunocompromised state.   Neurological:  Negative for dizziness, light-headedness and headaches.   Psychiatric/Behavioral:  Negative for behavioral problems and dysphoric mood.        OBJECTIVE:    /78 (Site: Left Upper Arm, Position: Sitting, Cuff Size: Large Adult)   Pulse 72   Wt 86.8 kg (191 lb 6.4 oz)   LMP  (LMP Unknown)   SpO2 98%   BMI 27.46 kg/m²    Physical Exam  Vitals and nursing note reviewed.   Constitutional:       General: She is not in acute distress.     Appearance: Normal appearance. She is not toxic-appearing.   HENT:      Head: Normocephalic.   Cardiovascular:      Rate and Rhythm: Normal rate.   Pulmonary:      Effort: Pulmonary effort is normal. No respiratory distress.   Musculoskeletal:         General: Tenderness present. No swelling. Normal range of motion.      Cervical back: Neck supple.      Right lower leg: No edema.      Left lower leg: No edema.   Skin:     General: Skin is warm and dry.   Neurological:      General: No focal deficit present.      Mental

## 2024-08-15 NOTE — ASSESSMENT & PLAN NOTE
generally stable however noticing shortness of breath when running, advised to try and use albuterol half an hour before her exercise regime to see if this will help otherwise she will continue with her pulmonologist as scheduled

## 2024-08-15 NOTE — ASSESSMENT & PLAN NOTE
remains on metformin, she did see endocrinology in consult, was started on Adipex to help with weight loss.  Patient doing Adipex intermittently and not daily in combination with intermittent fasting and active lifestyle.  Encouraged to continue same will update labs next visit in 6 months when she will be due for her annual wellness exam

## 2024-08-15 NOTE — ASSESSMENT & PLAN NOTE
pain does not involve knee joint itself and appears to be consistent with sprained muscle tendon causing discomfort posterior lateral aspect of left knee.  Injury probably precipitated by recently picking up running every other day, advised to wear knee sleeve, use low-dose ibuprofen for 5 days and skip 1 round of her exercise routine to give muscle chance to heal.

## 2024-08-15 NOTE — ASSESSMENT & PLAN NOTE
blood pressure stable and well-controlled on current dose of hydrochlorothiazide, will continue same, she continues to be active and exercising on a regular basis, she has been maintaining healthy low-salt diet.  Using Adipex intermittently without any side effects or effects on blood pressure, reevaluate in 6 months

## 2024-08-27 DIAGNOSIS — R09.1 PLEURISY: Primary | ICD-10-CM

## 2024-08-27 DIAGNOSIS — J45.20 MILD INTERMITTENT ASTHMA WITHOUT COMPLICATION: ICD-10-CM

## 2024-08-27 DIAGNOSIS — J45.41 MODERATE PERSISTENT ASTHMA WITH ACUTE EXACERBATION: ICD-10-CM

## 2024-08-28 ENCOUNTER — OFFICE VISIT (OUTPATIENT)
Dept: PULMONOLOGY | Age: 49
End: 2024-08-28
Payer: COMMERCIAL

## 2024-08-28 VITALS
BODY MASS INDEX: 27.2 KG/M2 | DIASTOLIC BLOOD PRESSURE: 76 MMHG | HEIGHT: 70 IN | OXYGEN SATURATION: 100 % | WEIGHT: 190 LBS | SYSTOLIC BLOOD PRESSURE: 108 MMHG | HEART RATE: 108 BPM

## 2024-08-28 DIAGNOSIS — J45.40 MODERATE PERSISTENT ASTHMA, UNCOMPLICATED: ICD-10-CM

## 2024-08-28 DIAGNOSIS — R07.81 PLEURITIC CHEST PAIN: Primary | ICD-10-CM

## 2024-08-28 PROCEDURE — 3078F DIAST BP <80 MM HG: CPT | Performed by: INTERNAL MEDICINE

## 2024-08-28 PROCEDURE — 3074F SYST BP LT 130 MM HG: CPT | Performed by: INTERNAL MEDICINE

## 2024-08-28 PROCEDURE — 99214 OFFICE O/P EST MOD 30 MIN: CPT | Performed by: INTERNAL MEDICINE

## 2024-08-28 RX ORDER — FLUTICASONE FUROATE, UMECLIDINIUM BROMIDE AND VILANTEROL TRIFENATATE 200; 62.5; 25 UG/1; UG/1; UG/1
1 POWDER RESPIRATORY (INHALATION) DAILY
Qty: 3 EACH | Refills: 3 | Status: SHIPPED | OUTPATIENT
Start: 2024-08-28

## 2024-08-28 RX ORDER — LEVALBUTEROL TARTRATE 45 UG/1
2 AEROSOL, METERED ORAL EVERY 4 HOURS PRN
Qty: 3 EACH | Refills: 3 | Status: SHIPPED | OUTPATIENT
Start: 2024-08-28 | End: 2025-08-28

## 2024-08-28 NOTE — PROGRESS NOTES
[facility] [place]  Follow Up Note  MD Disha Stanford SAMANTHA Wilman   YOB: 1975    Date of Visit:  8/28/2024    Assessment/Plan:  1. Pleurisy/chronic pneumothorax  The patient did have pneumothorax and VATS pleurodesis by Dr. Azul in 2008.  She had chronic loculated pleural effusion on CT imaging in 2012.  However, by imaging done on 1/24, pleural effusion has resolved.  She does have pleural thickening and some thickening of the minor fissure as well.  I reviewed imaging with the patient during today's visit.    She has been following with pain management for control pleuritic pain but still has breakthrough symptoms often when she is exercising.    2. Moderate persistent asthma without complication  She is having trouble with asthma control.  Symbicort makes her pretty shaky so she only takes it intermittently which may be part of her problem.  Will try her on Trelegy 200.  I demonstrated proper technique and asked her to be sure to rinse her mouth well after using.  Will also try to change her albuterol to Xopenex.  This should help combat her jitteriness as well as providing her perhaps a longer duration of activity    6 months      Chief Complaint   Patient presents with    Follow-up    Shortness of Breath     generally       HPI  The patient returns for follow-up of pleuritic chest pain and shortness of breath.  She is struggled for a number of years with right-sided pleuritic chest pain.  She had a chronic loculated pneumothorax but this had resolved as of imaging from 1/24.  She continues to have intermittent and rather severe right-sided chest pain that sounds pleuritic in nature.  She is also short of breath at times.  She has a history of moderate persistent asthma.  She has been using Symbicort for quite a few years but feels like it is no longer effective.  She takes albuterol which does help but does not last very long.  She has been  lungs as needed Yes Aldo Pruitt MD   methocarbamol (ROBAXIN) 750 MG tablet Take 1 tablet by mouth 3 times daily  Patient taking differently: Take 1 tablet by mouth as needed Yes Fabienne Samuel, APRN - CNP       Vitals:    08/28/24 1004   BP: 108/76   Site: Left Upper Arm   Position: Sitting   Cuff Size: Large Adult   Pulse: (!) 108   SpO2: 100%   Weight: 86.2 kg (190 lb)   Height: 1.778 m (5' 10\")       Body mass index is 27.26 kg/m².     Wt Readings from Last 3 Encounters:   08/28/24 86.2 kg (190 lb)   08/15/24 86.8 kg (191 lb 6.4 oz)   07/03/24 89.4 kg (197 lb)     BP Readings from Last 3 Encounters:   08/28/24 108/76   08/15/24 114/78   07/03/24 110/80        Social History     Tobacco Use   Smoking Status Never   Smokeless Tobacco Never

## 2024-09-26 ENCOUNTER — OFFICE VISIT (OUTPATIENT)
Dept: INTERNAL MEDICINE CLINIC | Age: 49
End: 2024-09-26
Payer: COMMERCIAL

## 2024-09-26 VITALS
BODY MASS INDEX: 27.78 KG/M2 | OXYGEN SATURATION: 100 % | DIASTOLIC BLOOD PRESSURE: 70 MMHG | HEART RATE: 65 BPM | WEIGHT: 193.6 LBS | SYSTOLIC BLOOD PRESSURE: 112 MMHG

## 2024-09-26 DIAGNOSIS — H92.01 OTALGIA OF RIGHT EAR: Primary | ICD-10-CM

## 2024-09-26 DIAGNOSIS — I10 HTN (HYPERTENSION), BENIGN: ICD-10-CM

## 2024-09-26 DIAGNOSIS — H93.11 TINNITUS OF RIGHT EAR: ICD-10-CM

## 2024-09-26 PROBLEM — M25.562 ACUTE PAIN OF LEFT KNEE: Status: RESOLVED | Noted: 2024-08-15 | Resolved: 2024-09-26

## 2024-09-26 PROCEDURE — 3074F SYST BP LT 130 MM HG: CPT | Performed by: INTERNAL MEDICINE

## 2024-09-26 PROCEDURE — 3078F DIAST BP <80 MM HG: CPT | Performed by: INTERNAL MEDICINE

## 2024-09-26 PROCEDURE — 99213 OFFICE O/P EST LOW 20 MIN: CPT | Performed by: INTERNAL MEDICINE

## 2024-09-26 RX ORDER — NEOMYCIN SULFATE, POLYMYXIN B SULFATE AND HYDROCORTISONE 10; 3.5; 1 MG/ML; MG/ML; [USP'U]/ML
3 SUSPENSION/ DROPS AURICULAR (OTIC) 4 TIMES DAILY
Qty: 1 EACH | Refills: 0 | Status: SHIPPED | OUTPATIENT
Start: 2024-09-26 | End: 2024-10-06

## 2024-09-26 RX ORDER — PREDNISONE 20 MG/1
60 TABLET ORAL DAILY
Qty: 15 TABLET | Refills: 0 | Status: SHIPPED | OUTPATIENT
Start: 2024-09-26 | End: 2024-10-01

## 2024-09-26 ASSESSMENT — ENCOUNTER SYMPTOMS
COUGH: 0
ABDOMINAL PAIN: 0
SORE THROAT: 0
RHINORRHEA: 0

## 2024-09-27 ENCOUNTER — PATIENT MESSAGE (OUTPATIENT)
Dept: INTERNAL MEDICINE CLINIC | Age: 49
End: 2024-09-27

## 2024-09-30 ENCOUNTER — PATIENT MESSAGE (OUTPATIENT)
Dept: INTERNAL MEDICINE CLINIC | Age: 49
End: 2024-09-30

## 2024-09-30 DIAGNOSIS — H93.11 TINNITUS OF RIGHT EAR: Primary | ICD-10-CM

## 2024-09-30 DIAGNOSIS — H92.01 OTALGIA OF RIGHT EAR: ICD-10-CM

## 2024-11-19 DIAGNOSIS — E88.819 INSULIN RESISTANCE: ICD-10-CM

## 2024-11-19 DIAGNOSIS — E66.3 OVERWEIGHT WITH BODY MASS INDEX (BMI) OF 28 TO 28.9 IN ADULT: ICD-10-CM

## 2024-11-19 DIAGNOSIS — Z83.3 FAMILY HISTORY OF DIABETES MELLITUS: ICD-10-CM

## 2024-11-19 RX ORDER — METFORMIN HYDROCHLORIDE 500 MG/1
1000 TABLET, EXTENDED RELEASE ORAL
Qty: 180 TABLET | Refills: 1 | Status: SHIPPED | OUTPATIENT
Start: 2024-11-19

## 2024-11-26 DIAGNOSIS — I10 HTN (HYPERTENSION), BENIGN: ICD-10-CM

## 2024-11-27 RX ORDER — HYDROCHLOROTHIAZIDE 25 MG/1
25 TABLET ORAL EVERY MORNING
Qty: 90 TABLET | Refills: 1 | Status: SHIPPED | OUTPATIENT
Start: 2024-11-27

## 2024-12-12 ENCOUNTER — TELEPHONE (OUTPATIENT)
Dept: INTERNAL MEDICINE CLINIC | Age: 49
End: 2024-12-12

## 2024-12-12 ENCOUNTER — OFFICE VISIT (OUTPATIENT)
Dept: INTERNAL MEDICINE CLINIC | Age: 49
End: 2024-12-12
Payer: COMMERCIAL

## 2024-12-12 VITALS
HEIGHT: 70 IN | SYSTOLIC BLOOD PRESSURE: 139 MMHG | WEIGHT: 197 LBS | TEMPERATURE: 97.3 F | DIASTOLIC BLOOD PRESSURE: 82 MMHG | OXYGEN SATURATION: 98 % | BODY MASS INDEX: 28.2 KG/M2 | HEART RATE: 64 BPM

## 2024-12-12 DIAGNOSIS — R21 RASH: Primary | ICD-10-CM

## 2024-12-12 PROCEDURE — 99213 OFFICE O/P EST LOW 20 MIN: CPT

## 2024-12-12 PROCEDURE — 3075F SYST BP GE 130 - 139MM HG: CPT

## 2024-12-12 PROCEDURE — 3079F DIAST BP 80-89 MM HG: CPT

## 2024-12-12 RX ORDER — DIPHENHYDRAMINE HCL 12.5MG/5ML
6.25 LIQUID (ML) ORAL 4 TIMES DAILY PRN
Qty: 1 ML | Refills: 4 | Status: SHIPPED | OUTPATIENT
Start: 2024-12-12 | End: 2025-01-11

## 2024-12-12 RX ORDER — BENZOCAINE/MENTHOL 6 MG-10 MG
LOZENGE MUCOUS MEMBRANE
Qty: 30 G | Refills: 1 | Status: SHIPPED | OUTPATIENT
Start: 2024-12-12 | End: 2024-12-19

## 2024-12-12 RX ORDER — FAMOTIDINE 20 MG/1
10 TABLET, FILM COATED ORAL DAILY
Qty: 15 TABLET | Refills: 0 | Status: SHIPPED | OUTPATIENT
Start: 2024-12-12 | End: 2025-01-11

## 2024-12-12 NOTE — PROGRESS NOTES
Disha Stovall (:  1975) is a 49 y.o. female,Established patient, here for evaluation of the following chief complaint(s):  Allergic Reaction (Allergic reaction face, neck and ears)      Assessment & Plan   ASSESSMENT/PLAN:  Assessment & Plan  Rash  Patient presents today for an acute visit. States that she has a rash on her forehead associated with burning sensation. She went to eye doctor on Saturday and was prescribed an antibiotic. Now she feels like her face is on fire. She took benadryl, tinactin which did not help. She subsequently went to Urgent care yesterday and was prescribed prednisone and eye ointment which has not helped. She has a history of allergies to cats and other environmental agents for which she is planned for allergy testing on Dec 30th. She denies any new foods, or using any new products/creams/detergents/bed sheets etc.     She denies difficulty breathing, throat closing. Denies any rashes anywhere else on the body. No skin sloughing. No oral/mucosal lesions. Denies fevers, chills, fatigue, weight gain/loss, night sweats, malaise/fatigue, poor appetite. Denies headaches, lightheadedness/dizziness, syncopal episodes, vision changes, photophobia, painful EOM, earache, tinnitus, hearing loss, sinus pressure, congestion, sore throat, difficulty swallowing, neck stiffness, or neck pain. Denies chest pain, palpitations, exercise intolerance, orthopnea, paroxysmal nocturnal dyspnea, leg swelling.     Patient provided with referral to dermatology. Counseled on keeping her allergy testing appointment. Patient to let us know if she does not improve in the next few days.    Patient was instructed to watch out for worsening symptoms including but not limited to fevers, chills, lightheadedness, headache, vision changes, shortness of breath, chest pain, nausea, vomiting, bleeding, syncope etc. If their clinical condition worsened, patient was instructed to either call the office or go

## 2024-12-12 NOTE — TELEPHONE ENCOUNTER
Marco calling in about diphenhydrAMINE. Dispense amt was 1 ML. They have 120 ML is stock. Verbal ok given to change dispense amt. No further action needed.

## 2024-12-31 ENCOUNTER — HOSPITAL ENCOUNTER (OUTPATIENT)
Age: 49
Discharge: HOME | End: 2024-12-31
Payer: COMMERCIAL

## 2024-12-31 LAB
BASOPHILS # BLD: 0 K/UL (ref 0–0.2)
BASOPHILS NFR BLD: 0.5 %
C3 SERPL-MCNC: 141 MG/DL (ref 90–180)
C4 SERPL-MCNC: 28.8 MG/DL (ref 10–40)
DEPRECATED RDW RBC AUTO: 13 % (ref 12.4–15.4)
EOSINOPHIL # BLD: 0.3 K/UL (ref 0–0.6)
EOSINOPHIL NFR BLD: 3.8 %
ERYTHROCYTE [SEDIMENTATION RATE] IN BLOOD BY WESTERGREN METHOD: 4 MM/HR (ref 0–20)
HCT VFR BLD AUTO: 42.4 % (ref 36–48)
HGB BLD-MCNC: 14.3 G/DL (ref 12–16)
IGM SERPL-MCNC: 53.9 MG/DL (ref 40–230)
LYMPHOCYTES # BLD: 1.6 K/UL (ref 1–5.1)
LYMPHOCYTES NFR BLD: 22.2 %
MCH RBC QN AUTO: 30.9 PG (ref 26–34)
MCHC RBC AUTO-ENTMCNC: 33.8 G/DL (ref 31–36)
MCV RBC AUTO: 91.5 FL (ref 80–100)
MONOCYTES # BLD: 0.6 K/UL (ref 0–1.3)
MONOCYTES NFR BLD: 8.3 %
NEUTROPHILS # BLD: 4.8 K/UL (ref 1.7–7.7)
NEUTROPHILS NFR BLD: 65.2 %
PLATELET # BLD AUTO: 262 K/UL (ref 135–450)
PMV BLD AUTO: 8.6 FL (ref 5–10.5)
RBC # BLD AUTO: 4.63 M/UL (ref 4–5.2)
THYROPEROXIDASE AB SERPL IA-ACNC: 9 IU/ML
WBC # BLD AUTO: 7.4 K/UL (ref 4–11)

## 2024-12-31 PROCEDURE — 85652 RBC SED RATE AUTOMATED: CPT

## 2024-12-31 PROCEDURE — 83520 IMMUNOASSAY QUANT NOS NONAB: CPT

## 2024-12-31 PROCEDURE — 36415 COLL VENOUS BLD VENIPUNCTURE: CPT

## 2024-12-31 PROCEDURE — 85025 COMPLETE CBC W/AUTO DIFF WBC: CPT

## 2024-12-31 PROCEDURE — 82785 ASSAY OF IGE: CPT

## 2024-12-31 PROCEDURE — 82784 ASSAY IGA/IGD/IGG/IGM EACH: CPT

## 2024-12-31 PROCEDURE — 86376 MICROSOMAL ANTIBODY EACH: CPT

## 2024-12-31 PROCEDURE — 86160 COMPLEMENT ANTIGEN: CPT

## 2025-01-02 LAB
IGE SERPL-ACNC: 64 KU/L
TRYPTASE SERPL-MCNC: 6.6 UG/L

## 2025-02-28 ASSESSMENT — PATIENT HEALTH QUESTIONNAIRE - PHQ9
SUM OF ALL RESPONSES TO PHQ QUESTIONS 1-9: 1
2. FEELING DOWN, DEPRESSED OR HOPELESS: SEVERAL DAYS
SUM OF ALL RESPONSES TO PHQ QUESTIONS 1-9: 1
1. LITTLE INTEREST OR PLEASURE IN DOING THINGS: NOT AT ALL
1. LITTLE INTEREST OR PLEASURE IN DOING THINGS: NOT AT ALL
SUM OF ALL RESPONSES TO PHQ9 QUESTIONS 1 & 2: 1
SUM OF ALL RESPONSES TO PHQ QUESTIONS 1-9: 1
2. FEELING DOWN, DEPRESSED OR HOPELESS: SEVERAL DAYS
SUM OF ALL RESPONSES TO PHQ QUESTIONS 1-9: 1

## 2025-03-03 ENCOUNTER — PATIENT MESSAGE (OUTPATIENT)
Dept: INTERNAL MEDICINE CLINIC | Age: 50
End: 2025-03-03

## 2025-03-03 ENCOUNTER — TELEPHONE (OUTPATIENT)
Dept: INTERNAL MEDICINE CLINIC | Age: 50
End: 2025-03-03

## 2025-03-03 ENCOUNTER — OFFICE VISIT (OUTPATIENT)
Dept: INTERNAL MEDICINE CLINIC | Age: 50
End: 2025-03-03

## 2025-03-03 VITALS
BODY MASS INDEX: 28.67 KG/M2 | OXYGEN SATURATION: 100 % | SYSTOLIC BLOOD PRESSURE: 130 MMHG | HEART RATE: 72 BPM | DIASTOLIC BLOOD PRESSURE: 85 MMHG | WEIGHT: 199.8 LBS

## 2025-03-03 DIAGNOSIS — Z00.00 ANNUAL PHYSICAL EXAM: ICD-10-CM

## 2025-03-03 DIAGNOSIS — L02.31 ABSCESS OF BUTTOCK, RIGHT: ICD-10-CM

## 2025-03-03 DIAGNOSIS — Z76.89 ENCOUNTER FOR WEIGHT MANAGEMENT: ICD-10-CM

## 2025-03-03 DIAGNOSIS — E66.3 OVERWEIGHT WITH BODY MASS INDEX (BMI) OF 28 TO 28.9 IN ADULT: ICD-10-CM

## 2025-03-03 DIAGNOSIS — J45.40 MODERATE PERSISTENT ASTHMA, UNCOMPLICATED: ICD-10-CM

## 2025-03-03 DIAGNOSIS — Z00.00 ANNUAL PHYSICAL EXAM: Primary | ICD-10-CM

## 2025-03-03 DIAGNOSIS — I10 HTN (HYPERTENSION), BENIGN: ICD-10-CM

## 2025-03-03 DIAGNOSIS — E88.819 INSULIN RESISTANCE: ICD-10-CM

## 2025-03-03 PROBLEM — G47.33 OBSTRUCTIVE SLEEP APNEA: Status: ACTIVE | Noted: 2025-02-06

## 2025-03-03 PROBLEM — H92.01 OTALGIA OF RIGHT EAR: Status: RESOLVED | Noted: 2024-09-26 | Resolved: 2025-03-03

## 2025-03-03 LAB
ALBUMIN SERPL-MCNC: 4.5 G/DL (ref 3.4–5)
ALBUMIN/GLOB SERPL: 1.5 {RATIO} (ref 1.1–2.2)
ALP SERPL-CCNC: 56 U/L (ref 40–129)
ALT SERPL-CCNC: 16 U/L (ref 10–40)
ANION GAP SERPL CALCULATED.3IONS-SCNC: 11 MMOL/L (ref 3–16)
AST SERPL-CCNC: 26 U/L (ref 15–37)
BILIRUB SERPL-MCNC: 0.4 MG/DL (ref 0–1)
BUN SERPL-MCNC: 12 MG/DL (ref 7–20)
CALCIUM SERPL-MCNC: 10.1 MG/DL (ref 8.3–10.6)
CHLORIDE SERPL-SCNC: 98 MMOL/L (ref 99–110)
CHOLEST SERPL-MCNC: 232 MG/DL (ref 0–199)
CO2 SERPL-SCNC: 27 MMOL/L (ref 21–32)
CREAT SERPL-MCNC: 1 MG/DL (ref 0.6–1.1)
DEPRECATED RDW RBC AUTO: 12.4 % (ref 12.4–15.4)
EST. AVERAGE GLUCOSE BLD GHB EST-MCNC: 119.8 MG/DL
FOLATE SERPL-MCNC: 9.55 NG/ML (ref 4.78–24.2)
GFR SERPLBLD CREATININE-BSD FMLA CKD-EPI: 69 ML/MIN/{1.73_M2}
GLUCOSE SERPL-MCNC: 93 MG/DL (ref 70–99)
HBA1C MFR BLD: 5.8 %
HCT VFR BLD AUTO: 45.2 % (ref 36–48)
HDLC SERPL-MCNC: 63 MG/DL (ref 40–60)
HGB BLD-MCNC: 15.5 G/DL (ref 12–16)
LDLC SERPL CALC-MCNC: 158 MG/DL
MCH RBC QN AUTO: 30.8 PG (ref 26–34)
MCHC RBC AUTO-ENTMCNC: 34.4 G/DL (ref 31–36)
MCV RBC AUTO: 89.7 FL (ref 80–100)
PLATELET # BLD AUTO: 330 K/UL (ref 135–450)
PMV BLD AUTO: 8 FL (ref 5–10.5)
POTASSIUM SERPL-SCNC: 4.4 MMOL/L (ref 3.5–5.1)
PROT SERPL-MCNC: 7.6 G/DL (ref 6.4–8.2)
RBC # BLD AUTO: 5.04 M/UL (ref 4–5.2)
SODIUM SERPL-SCNC: 136 MMOL/L (ref 136–145)
TRIGL SERPL-MCNC: 56 MG/DL (ref 0–150)
TSH SERPL DL<=0.005 MIU/L-ACNC: 0.85 UIU/ML (ref 0.27–4.2)
VIT B12 SERPL-MCNC: 728 PG/ML (ref 211–911)
VLDLC SERPL CALC-MCNC: 11 MG/DL
WBC # BLD AUTO: 6 K/UL (ref 4–11)

## 2025-03-03 RX ORDER — IBUPROFEN 800 MG/1
800 TABLET, FILM COATED ORAL EVERY 8 HOURS PRN
Qty: 30 TABLET | Refills: 0 | Status: SHIPPED | OUTPATIENT
Start: 2025-03-03

## 2025-03-03 RX ORDER — TIRZEPATIDE 2.5 MG/.5ML
2.5 INJECTION, SOLUTION SUBCUTANEOUS WEEKLY
Qty: 2 ML | Refills: 3 | Status: SHIPPED | OUTPATIENT
Start: 2025-03-03

## 2025-03-03 RX ORDER — CEPHALEXIN 500 MG/1
500 CAPSULE ORAL 3 TIMES DAILY
Qty: 21 CAPSULE | Refills: 0 | Status: SHIPPED | OUTPATIENT
Start: 2025-03-03 | End: 2025-03-10

## 2025-03-03 SDOH — ECONOMIC STABILITY: FOOD INSECURITY: WITHIN THE PAST 12 MONTHS, THE FOOD YOU BOUGHT JUST DIDN'T LAST AND YOU DIDN'T HAVE MONEY TO GET MORE.: NEVER TRUE

## 2025-03-03 SDOH — ECONOMIC STABILITY: FOOD INSECURITY: WITHIN THE PAST 12 MONTHS, YOU WORRIED THAT YOUR FOOD WOULD RUN OUT BEFORE YOU GOT MONEY TO BUY MORE.: NEVER TRUE

## 2025-03-03 ASSESSMENT — ENCOUNTER SYMPTOMS
COLOR CHANGE: 0
VOMITING: 0
BACK PAIN: 0
COUGH: 0
EYE DISCHARGE: 0
CHEST TIGHTNESS: 0
SINUS PRESSURE: 0
CONSTIPATION: 0
SHORTNESS OF BREATH: 1
ABDOMINAL PAIN: 0
WHEEZING: 0
SORE THROAT: 0
NAUSEA: 0

## 2025-03-03 NOTE — ASSESSMENT & PLAN NOTE
remains mostly stable on current inhalation regimen under supervision of pulmonary, continue same

## 2025-03-03 NOTE — TELEPHONE ENCOUNTER
Please have your provider send an electronic prescription for the Zepbound vial to [facility] or [facility] cash pay for zepbound vial. Use the NDC codes provided below and send it to NPI: [id number] and [facility]: [id number]. The address is [address] [place] [address] [address], [place] [address]. Thank you!  - 2.5mg NDC: [address]-44  - 5mg NDC: [address]-70  -[id number]  -[id number]            PATIENT HAS TO HAVE VIALS FOR THIS PHARMACY.     The vial is pended to pharmacy requested

## 2025-03-03 NOTE — ASSESSMENT & PLAN NOTE
Continue warm compressors, start Keflex, advised if no improvement or worsening symptoms will need referral to general surgery for I&D or complete surgical excision.  Patient voiced understanding and will call the office if needed

## 2025-03-03 NOTE — TELEPHONE ENCOUNTER
Need PA for tirzepatide-weight management (ZEPBOUND) 2.5 MG/0.5ML SOAJ subCUTAneous auto-injector pen

## 2025-03-03 NOTE — ASSESSMENT & PLAN NOTE
Age-related health maintenance and immunization recommendations reviewed and discussed with patient, declined flu vaccine  Labs ordered, healthy diet and regular exercise recommendations reinforced and encouraged continue active lifestyle  Continue abstinence from smoking  Patient up-to-date with mammography  Patient up-to-date with GYN exam  Patient up-to-date with colon cancer screening  Depression screen is negative

## 2025-03-03 NOTE — PROGRESS NOTES
Pupils are equal, round, and reactive to light.   Neck:      Vascular: No carotid bruit.   Cardiovascular:      Rate and Rhythm: Normal rate and regular rhythm.      Pulses: Normal pulses.      Heart sounds: Normal heart sounds. No murmur heard.  Pulmonary:      Effort: Pulmonary effort is normal. No respiratory distress.      Breath sounds: Normal breath sounds. No wheezing.   Abdominal:      General: Bowel sounds are normal.      Palpations: Abdomen is soft. There is no mass.      Tenderness: There is no abdominal tenderness. There is no rebound.   Musculoskeletal:         General: No swelling, deformity or signs of injury. Normal range of motion.      Cervical back: Normal range of motion and neck supple. No tenderness.      Right lower leg: No edema.      Left lower leg: No edema.   Skin:     General: Skin is warm and dry.      Findings: Lesion present.      Comments: Small rounded inflamed/infected cyst right buttock cheek with purulent discharge, tender to palpation   Neurological:      General: No focal deficit present.      Mental Status: She is alert and oriented to person, place, and time. Mental status is at baseline.      Cranial Nerves: No cranial nerve deficit.   Psychiatric:         Mood and Affect: Mood normal.         Behavior: Behavior normal.         Thought Content: Thought content normal.           Electronically signed by Stephani Valdes MD on 3/3/2025 at 9:17 AM.    This dictation was generated by voice recognition computer software.  Although all attempts are made to edit the dictation for accuracy, there may be errors in the transcription that are not intended.

## 2025-03-03 NOTE — ASSESSMENT & PLAN NOTE
As noted previously patient did follow-up with endocrinology and remains on metformin for insulin resistance, continues to be unable to lose weight despite diet and exercise and now interested in starting GLP-1 agent.  Counseling done at length, aware of potential side effects and instructions on use, she will be doing self-pay through [facility], will start 2.5 mg weekly injections and reevaluate in 3 months.  Reinforced recommendations to continue adhering to healthy diet and active lifestyle with regular exercise

## 2025-03-16 ENCOUNTER — APPOINTMENT (OUTPATIENT)
Dept: CT IMAGING | Age: 50
End: 2025-03-16
Payer: COMMERCIAL

## 2025-03-16 ENCOUNTER — HOSPITAL ENCOUNTER (EMERGENCY)
Age: 50
Discharge: HOME | End: 2025-03-16
Attending: EMERGENCY MEDICINE
Payer: COMMERCIAL

## 2025-03-16 VITALS
TEMPERATURE: 98.2 F | HEIGHT: 70 IN | SYSTOLIC BLOOD PRESSURE: 138 MMHG | OXYGEN SATURATION: 97 % | HEART RATE: 77 BPM | RESPIRATION RATE: 16 BRPM | BODY MASS INDEX: 29.76 KG/M2 | DIASTOLIC BLOOD PRESSURE: 95 MMHG | WEIGHT: 207.9 LBS
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
## 2025-03-16 DIAGNOSIS — G44.209 ACUTE NON INTRACTABLE TENSION-TYPE HEADACHE: ICD-10-CM

## 2025-03-16 DIAGNOSIS — S13.4XXA WHIPLASH INJURY TO NECK, INITIAL ENCOUNTER: ICD-10-CM

## 2025-03-16 PROCEDURE — 99284 EMERGENCY DEPT VISIT MOD MDM: CPT

## 2025-03-16 PROCEDURE — 70450 CT HEAD/BRAIN W/O DYE: CPT

## 2025-03-16 PROCEDURE — 6370000000 HC RX 637 (ALT 250 FOR IP): Performed by: EMERGENCY MEDICINE

## 2025-03-16 RX ORDER — ACETAMINOPHEN 325 MG/1
650 TABLET ORAL ONCE
Status: COMPLETED | OUTPATIENT
Start: 2025-03-16 | End: 2025-03-16

## 2025-03-16 RX ORDER — IBUPROFEN 600 MG/1
600 TABLET, FILM COATED ORAL ONCE
Status: COMPLETED | OUTPATIENT
Start: 2025-03-16 | End: 2025-03-16

## 2025-03-16 RX ORDER — METHOCARBAMOL 750 MG/1
750 TABLET, FILM COATED ORAL 4 TIMES DAILY
Qty: 40 TABLET | Refills: 0 | Status: SHIPPED | OUTPATIENT
Start: 2025-03-16 | End: 2025-03-26

## 2025-03-16 RX ORDER — IBUPROFEN 600 MG/1
600 TABLET, FILM COATED ORAL 3 TIMES DAILY PRN
Qty: 30 TABLET | Refills: 0 | Status: SHIPPED | OUTPATIENT
Start: 2025-03-16

## 2025-03-16 RX ADMIN — IBUPROFEN 600 MG: 600 TABLET, FILM COATED ORAL at 06:27

## 2025-03-16 RX ADMIN — ACETAMINOPHEN 650 MG: 325 TABLET ORAL at 06:27

## 2025-03-16 ASSESSMENT — PAIN DESCRIPTION - PAIN TYPE: TYPE: ACUTE PAIN

## 2025-03-16 ASSESSMENT — PAIN SCALES - GENERAL: PAINLEVEL_OUTOF10: 9

## 2025-03-16 ASSESSMENT — PAIN - FUNCTIONAL ASSESSMENT: PAIN_FUNCTIONAL_ASSESSMENT: 0-10

## 2025-03-16 ASSESSMENT — PAIN DESCRIPTION - LOCATION: LOCATION: HEAD

## 2025-03-16 NOTE — ED PROVIDER NOTES
(3/30/2024)    Exercise Vital Sign     Days of Exercise per Week: 5 days     Minutes of Exercise per Session: 40 min    Received from [facility], [facility]    Interpersonal Safety   Housing Stability: Low Risk  (3/3/2025)    Housing Stability Vital Sign     Unable to Pay for Housing in the Last Year: No     Number of Times Moved in the Last Year: 0     Homeless in the Last Year: No     SCREENINGS   NIH Stroke Scale  Interval: Baseline  Level of Consciousness (1a): Alert  LOC Questions (1b): Answers both correctly  LOC Commands (1c): Performs both tasks correctly  Best Gaze (2): Normal  Visual (3): No visual loss  Facial Palsy (4): Normal symmetrical movement  Motor Arm, Left (5a): No drift  Motor Arm, Right (5b): No drift  Motor Leg, Left (6a): No drift  Motor Leg, Right (6b): No drift  Limb Ataxia (7): Absent  Sensory (8): Normal  Best Language (9): No aphasia  Dysarthria (10): Normal  Extinction and Inattention (11): No abnormality  Total: 0    Chiquita Coma Scale  Eye Opening: Spontaneous  Best Verbal Response: Oriented  Best Motor Response: Obeys commands  Chiquita Coma Scale Score: 15                  CIWA Assessment  BP: (!) 138/95  Pulse: 77         PHYSICAL EXAM  1 or more Elements   INITIAL VITALS: BP: (!) 138/95, Temp: 98.2 °F (36.8 °C), Pulse: 77, Respirations: 16, SpO2: 97 % Height: 177.8 cm (5' 10\")   Wt Readings from Last 3 Encounters:   03/16/25 94.3 kg (207 lb 14.4 oz)   03/03/25 90.6 kg (199 lb 12.8 oz)   12/12/24 89.4 kg (197 lb)     Physical Exam  Constitutional:       General: She is not in acute distress.     Appearance: Normal appearance. She is not ill-appearing.   HENT:      Head: Normocephalic and atraumatic.      Right Ear: Tympanic membrane and external ear normal.      Left Ear: Tympanic membrane and external ear normal.      Nose: Nose normal. No congestion or rhinorrhea.      Mouth/Throat:      Mouth: Mucous membranes are moist.

## 2025-03-18 ENCOUNTER — OFFICE VISIT (OUTPATIENT)
Dept: INTERNAL MEDICINE CLINIC | Age: 50
End: 2025-03-18
Payer: COMMERCIAL

## 2025-03-18 VITALS
DIASTOLIC BLOOD PRESSURE: 90 MMHG | SYSTOLIC BLOOD PRESSURE: 130 MMHG | HEART RATE: 99 BPM | WEIGHT: 197.8 LBS | BODY MASS INDEX: 28.38 KG/M2 | OXYGEN SATURATION: 74 %
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
## 2025-03-18 DIAGNOSIS — G44.201 ACUTE INTRACTABLE TENSION-TYPE HEADACHE: ICD-10-CM

## 2025-03-18 DIAGNOSIS — I10 HTN (HYPERTENSION), BENIGN: ICD-10-CM
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
## 2025-03-18 PROCEDURE — 99214 OFFICE O/P EST MOD 30 MIN: CPT | Performed by: INTERNAL MEDICINE

## 2025-03-18 PROCEDURE — 3080F DIAST BP >= 90 MM HG: CPT | Performed by: INTERNAL MEDICINE

## 2025-03-18 PROCEDURE — 3075F SYST BP GE 130 - 139MM HG: CPT | Performed by: INTERNAL MEDICINE

## 2025-03-18 RX ORDER — BUTALBITAL, ACETAMINOPHEN AND CAFFEINE 50; 325; 40 MG/1; MG/1; MG/1
TABLET ORAL
Qty: 60 TABLET | Refills: 0 | Status: SHIPPED | OUTPATIENT
Start: 2025-03-18

## 2025-03-18 ASSESSMENT — ENCOUNTER SYMPTOMS
NAUSEA: 0
BACK PAIN: 0
VOMITING: 0
WHEEZING: 0
COLOR CHANGE: 0
ABDOMINAL PAIN: 0
CHEST TIGHTNESS: 0
SORE THROAT: 0
SHORTNESS OF BREATH: 0
COUGH: 0
CONSTIPATION: 0
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
## 2025-03-18 NOTE — ASSESSMENT & PLAN NOTE
ER note and imaging studies reviewed and discussed with patient suspect possible concussion given ongoing headache, see below

## 2025-03-19 ENCOUNTER — PATIENT MESSAGE (OUTPATIENT)
Dept: INTERNAL MEDICINE CLINIC | Age: 50
End: 2025-03-19
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
## 2025-03-19 DIAGNOSIS — G44.309 POST-CONCUSSION HEADACHE: ICD-10-CM

## 2025-03-19 DIAGNOSIS — R42 DIZZINESS: ICD-10-CM

## 2025-03-19 NOTE — TELEPHONE ENCOUNTER
Can monitor the symptoms till the end of this week and if no improvement then will order MRI imaging

## 2025-03-21 PROBLEM — R42 DIZZINESS: Status: ACTIVE | Noted: 2025-03-21

## 2025-03-21 PROBLEM — G44.309 POST-CONCUSSION HEADACHE: Status: ACTIVE | Noted: 2025-03-21

## 2025-03-21 NOTE — TELEPHONE ENCOUNTER
I will place an order for MRI of the brain, as previously explained to patient postconcussion symptoms can last for some time but since she continues to be very anxious about her symptoms we will go ahead and complete the MRI.

## 2025-04-02 ENCOUNTER — OFFICE VISIT (OUTPATIENT)
Dept: INTERNAL MEDICINE CLINIC | Age: 50
End: 2025-04-02
Payer: COMMERCIAL

## 2025-04-02 VITALS
OXYGEN SATURATION: 100 % | BODY MASS INDEX: 28.09 KG/M2 | HEART RATE: 71 BPM | DIASTOLIC BLOOD PRESSURE: 74 MMHG | SYSTOLIC BLOOD PRESSURE: 126 MMHG | WEIGHT: 195.8 LBS

## 2025-04-02 DIAGNOSIS — M54.2 NECK PAIN: ICD-10-CM

## 2025-04-02 DIAGNOSIS — F07.81 POST CONCUSSION SYNDROME: Primary | ICD-10-CM

## 2025-04-02 DIAGNOSIS — R42 DIZZINESS: ICD-10-CM

## 2025-04-02 DIAGNOSIS — G44.309 POST-CONCUSSION HEADACHE: ICD-10-CM

## 2025-04-02 PROBLEM — L02.31 ABSCESS OF BUTTOCK, RIGHT: Status: RESOLVED | Noted: 2025-03-03 | Resolved: 2025-04-02

## 2025-04-02 PROBLEM — G44.201 ACUTE INTRACTABLE TENSION-TYPE HEADACHE: Status: RESOLVED | Noted: 2025-03-18 | Resolved: 2025-04-02

## 2025-04-02 PROCEDURE — 3078F DIAST BP <80 MM HG: CPT | Performed by: INTERNAL MEDICINE

## 2025-04-02 PROCEDURE — 3074F SYST BP LT 130 MM HG: CPT | Performed by: INTERNAL MEDICINE

## 2025-04-02 PROCEDURE — 99214 OFFICE O/P EST MOD 30 MIN: CPT | Performed by: INTERNAL MEDICINE

## 2025-04-02 RX ORDER — CYCLOBENZAPRINE HCL 10 MG
10 TABLET ORAL 3 TIMES DAILY PRN
Qty: 30 TABLET | Refills: 0 | Status: SHIPPED | OUTPATIENT
Start: 2025-04-02 | End: 2025-04-12

## 2025-04-02 ASSESSMENT — ENCOUNTER SYMPTOMS
ABDOMINAL PAIN: 0
BACK PAIN: 0
VOMITING: 0
CONSTIPATION: 0
COLOR CHANGE: 0
SORE THROAT: 0
WHEEZING: 0
CHEST TIGHTNESS: 0
SHORTNESS OF BREATH: 0
COUGH: 0
NAUSEA: 0
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
## 2025-04-02 NOTE — ASSESSMENT & PLAN NOTE
Will try a different muscle relaxer to see if this will have better relief on her symptoms than Robaxin, cautioned again about possible side effects of drowsiness and sleepiness
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
## 2025-04-03 ENCOUNTER — PATIENT MESSAGE (OUTPATIENT)
Dept: INTERNAL MEDICINE CLINIC | Age: 50
End: 2025-04-03

## 2025-04-03 DIAGNOSIS — R42 DIZZINESS: ICD-10-CM

## 2025-04-03 DIAGNOSIS — G44.309 POST-CONCUSSION HEADACHE: ICD-10-CM

## 2025-04-03 DIAGNOSIS — F07.81 POST CONCUSSION SYNDROME: Primary | ICD-10-CM

## 2025-04-04 ENCOUNTER — OFFICE VISIT (OUTPATIENT)
Dept: PULMONOLOGY | Age: 50
End: 2025-04-04
Payer: COMMERCIAL

## 2025-04-04 VITALS
BODY MASS INDEX: 28.09 KG/M2 | OXYGEN SATURATION: 100 % | HEIGHT: 70 IN | SYSTOLIC BLOOD PRESSURE: 126 MMHG | WEIGHT: 196.2 LBS | DIASTOLIC BLOOD PRESSURE: 72 MMHG | HEART RATE: 85 BPM

## 2025-04-04 DIAGNOSIS — R07.81 PLEURITIC CHEST PAIN: Primary | ICD-10-CM

## 2025-04-04 DIAGNOSIS — J45.40 MODERATE PERSISTENT ASTHMA, UNCOMPLICATED: ICD-10-CM

## 2025-04-04 PROCEDURE — 3078F DIAST BP <80 MM HG: CPT | Performed by: INTERNAL MEDICINE

## 2025-04-04 PROCEDURE — 3074F SYST BP LT 130 MM HG: CPT | Performed by: INTERNAL MEDICINE

## 2025-04-04 PROCEDURE — 99214 OFFICE O/P EST MOD 30 MIN: CPT | Performed by: INTERNAL MEDICINE

## 2025-04-04 RX ORDER — NAPROXEN 500 MG/1
500 TABLET ORAL 2 TIMES DAILY WITH MEALS
Qty: 60 TABLET | Refills: 3 | Status: SHIPPED | OUTPATIENT
Start: 2025-04-04

## 2025-04-04 RX ORDER — IBUPROFEN 600 MG/1
600 TABLET, FILM COATED ORAL 3 TIMES DAILY PRN
Qty: 30 TABLET | Refills: 0 | Status: CANCELLED | OUTPATIENT
Start: 2025-04-04

## 2025-04-04 RX ORDER — NAPROXEN 500 MG/1
500 TABLET ORAL 2 TIMES DAILY WITH MEALS
COMMUNITY
End: 2025-04-04 | Stop reason: SDUPTHER

## 2025-04-04 NOTE — PROGRESS NOTES
[facility]  Follow Up Note  MD Disha Stanford SAMANTHA Wilman   YOB: 1975    Date of Visit:  4/4/2025    Assessment/Plan:  1. Pleurisy/chronic pneumothorax  The patient did have pneumothorax and VATS pleurodesis by Dr. Azul in 2008.  She had chronic loculated pleural effusion on CT imaging in 2012.  However, by imaging done on 1/24, pleural effusion has resolved.  She does have pleural thickening and some thickening of the minor fissure as well.  I reviewed imaging with the patient during today's visit.    I refilled Naprosyn for her    2. Moderate persistent asthma without complication  She is having difficulty with exertional dyspnea.  Chest exam today is clear.  Review of CBC in her medical record reveals that she did have 300 eosinophils on her most recent CBC.    Plan:  PFT with bronchodilator  Continue Trelegy  Consider Dupixent as she does have evidence of type II inflammation      6 months      Chief Complaint   Patient presents with    6 Month Follow-Up     Pain       HPI  The patient returns for follow-up of pleuritic chest pain and shortness of breath.    She has been relatively stable but continues to have difficulty with right-sided pleuritic pain and exertion related shortness of breath.  She was hoping to run a marathon for her 50th birthday.  In preparation for that she tried to train for and run the turkey trot last November.  She struggled with that quite a bit.  She said she recorded herself while she was running and noticed that her breathing was really bad on the recording.  She felt it took her several days to recover from that.  She also reports shortness of breath climbing stairs.  She has intermittent wheezing.  Sometimes she feels like her chest is restricted when she wants to take a deep breath.  She is taking Trelegy and she feels like that is better than the Symbicort she was using before.  She has not had bronchitis

## 2025-04-07 ENCOUNTER — PATIENT MESSAGE (OUTPATIENT)
Dept: INTERNAL MEDICINE CLINIC | Age: 50
End: 2025-04-07

## 2025-04-07 DIAGNOSIS — Z76.89 ENCOUNTER FOR WEIGHT MANAGEMENT: ICD-10-CM

## 2025-04-07 DIAGNOSIS — E88.819 INSULIN RESISTANCE: ICD-10-CM

## 2025-04-07 NOTE — TELEPHONE ENCOUNTER
Pharmacy sent a fax stating they cannot fill the order the way it was written. It has to be written as:     Zepbound 5MG/0.5 Ml vial

## 2025-04-10 ENCOUNTER — HOSPITAL ENCOUNTER (OUTPATIENT)
Dept: MRI IMAGING | Age: 50
Discharge: HOME | End: 2025-04-10
Payer: COMMERCIAL
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
## 2025-04-10 DIAGNOSIS — R42 DIZZINESS: ICD-10-CM

## 2025-04-10 DIAGNOSIS — G44.309 POST-CONCUSSION HEADACHE: ICD-10-CM

## 2025-04-10 PROCEDURE — 6360000004 HC RX CONTRAST MEDICATION: Performed by: INTERNAL MEDICINE

## 2025-04-10 PROCEDURE — 70553 MRI BRAIN STEM W/O & W/DYE: CPT

## 2025-04-10 PROCEDURE — A9577 INJ MULTIHANCE: HCPCS | Performed by: INTERNAL MEDICINE

## 2025-04-10 RX ADMIN — GADOBENATE DIMEGLUMINE 17 ML: 529 INJECTION, SOLUTION INTRAVENOUS at 19:27

## 2025-04-11 ENCOUNTER — PATIENT MESSAGE (OUTPATIENT)
Dept: INTERNAL MEDICINE CLINIC | Age: 50
End: 2025-04-11

## 2025-04-14 ENCOUNTER — HOSPITAL ENCOUNTER (OUTPATIENT)
Dept: PULMONOLOGY | Age: 50
Discharge: HOME | End: 2025-04-14
Attending: INTERNAL MEDICINE
Payer: COMMERCIAL

## 2025-04-14 ENCOUNTER — PATIENT MESSAGE (OUTPATIENT)
Dept: INTERNAL MEDICINE CLINIC | Age: 50
End: 2025-04-14

## 2025-04-14 DIAGNOSIS — J45.40 MODERATE PERSISTENT ASTHMA, UNCOMPLICATED: ICD-10-CM

## 2025-04-14 LAB
DLCO %PRED: 88 %
DLCO PRED: NORMAL
DLCO/VA %PRED: NORMAL
DLCO/VA PRED: NORMAL
DLCO/VA: NORMAL
DLCO: NORMAL
EXPIRATORY TIME-POST: NORMAL
EXPIRATORY TIME: NORMAL
FEF 25-75 %CHNG: NORMAL
FEF 25-75 POST %PRED: NORMAL
FEF 25-75% %PRED-PRE: NORMAL
FEF 25-75% PRED: NORMAL
FEF 25-75-POST: NORMAL
FEF 25-75-PRE: NORMAL
FEV1 %PRED-POST: 74 %
FEV1 %PRED-PRE: 65 %
FEV1 PRED: NORMAL
FEV1-POST: NORMAL
FEV1-PRE: NORMAL
FEV1/FVC %PRED-POST: NORMAL
FEV1/FVC %PRED-PRE: NORMAL
FEV1/FVC PRED: NORMAL
FEV1/FVC-POST: 89 %
FEV1/FVC-PRE: 88 %
FVC %PRED-POST: NORMAL
FVC %PRED-PRE: NORMAL
FVC PRED: NORMAL
FVC-POST: NORMAL
FVC-PRE: NORMAL
GAW %PRED: NORMAL
GAW PRED: NORMAL
GAW: NORMAL
IC PRE %PRED: NORMAL
IC PRED: NORMAL
IC: NORMAL
MEP: NORMAL
MIP: NORMAL
MVV %PRED-PRE: NORMAL
MVV PRED: NORMAL
MVV-PRE: NORMAL
PEF %PRED-POST: NORMAL
PEF %PRED-PRE: NORMAL
PEF PRED: NORMAL
PEF%CHNG: NORMAL
PEF-POST: NORMAL
PEF-PRE: NORMAL
RAW %PRED: NORMAL
RAW PRED: NORMAL
RAW: NORMAL
RV PRE %PRED: NORMAL
RV PRED: NORMAL
RV: NORMAL
SVC %PRED: NORMAL
SVC PRED: NORMAL
SVC: NORMAL
TLC PRE %PRED: 78 %
TLC PRED: NORMAL
TLC: NORMAL
VA %PRED: NORMAL
VA PRED: NORMAL
VA: NORMAL
VTG %PRED: NORMAL
VTG PRED: NORMAL
VTG: NORMAL

## 2025-04-14 PROCEDURE — 94729 DIFFUSING CAPACITY: CPT

## 2025-04-14 PROCEDURE — 94760 N-INVAS EAR/PLS OXIMETRY 1: CPT

## 2025-04-14 PROCEDURE — 94726 PLETHYSMOGRAPHY LUNG VOLUMES: CPT

## 2025-04-14 PROCEDURE — 6370000000 HC RX 637 (ALT 250 FOR IP): Performed by: INTERNAL MEDICINE

## 2025-04-14 PROCEDURE — 94060 EVALUATION OF WHEEZING: CPT

## 2025-04-14 RX ORDER — ALBUTEROL SULFATE 90 UG/1
4 INHALANT RESPIRATORY (INHALATION) ONCE
Status: COMPLETED | OUTPATIENT
Start: 2025-04-14 | End: 2025-04-14

## 2025-04-14 RX ADMIN — Medication 4 PUFF: at 08:09

## 2025-04-14 ASSESSMENT — PULMONARY FUNCTION TESTS
FEV1/FVC_PRE: 88
FEV1/FVC_POST: 89
FEV1_PERCENT_PREDICTED_PRE: 65
FEV1_PERCENT_PREDICTED_POST: 74

## 2025-04-14 NOTE — TELEPHONE ENCOUNTER
Please advise patient I will not be able to complete any ADA forms until she completes neurology evaluation.  We can give her a brief letter stating it is recommended she continues working from home until further evaluation by neurology and afterwards will need to discuss this with neurology if they make a diagnosis explaining her symptoms.

## 2025-04-14 NOTE — TELEPHONE ENCOUNTER
Will need to complete evaluation by neurology first and if they feel further imaging is needed they can order that

## 2025-04-15 NOTE — PROCEDURES
Pulmonary Function Testing      Patient name:  Disha Stovall      Unit #:   [id number]   Date of test:  4/14/2025   Date of interpretation:   4/15/2025    Ms. Disha Stovall is a 49 y.o. year-old non smoker. The spirometry data were acceptable and reproducible.     Spirometry:  Flow volume loops were obstructed. The FEV-1/FVC ratio was decreased. The pre-bronchodilator FEV-1 was 2.15 liters (65% of predicted), which was moderately decreased. The FVC was 3.06 liters (74% of predicted), which was decreased. Response to inhaled bronchodilators (albuterol) was not significant.    Lung volumes:  Lung volumes were tested by plethysmography. The total lung capacity was 4.64 liters (78% of predicted), which was decreased. The residual volume was 1.61 liters (77% of predicted), which was normal. The ratio of residual volume to total lung capacity (RV/TLC) was 35, which was normal.     Diffusion capacity was found to be 88% predicted which is Normal.      Interpretation:  Signs of moderate obstruction noted with no significant bronchodilator reversibility.    Comments:

## 2025-04-16 ENCOUNTER — TELEPHONE (OUTPATIENT)
Dept: PULMONOLOGY | Age: 50
End: 2025-04-16

## 2025-04-18 ENCOUNTER — PATIENT MESSAGE (OUTPATIENT)
Dept: INTERNAL MEDICINE CLINIC | Age: 50
End: 2025-04-18

## 2025-04-18 DIAGNOSIS — G44.309 POST-CONCUSSION HEADACHE: ICD-10-CM

## 2025-04-18 DIAGNOSIS — R93.0 ABNORMAL MRI OF HEAD: ICD-10-CM

## 2025-04-18 DIAGNOSIS — R42 DIZZINESS: ICD-10-CM

## 2025-04-18 DIAGNOSIS — F07.81 POST CONCUSSION SYNDROME: Primary | ICD-10-CM

## 2025-04-21 NOTE — TELEPHONE ENCOUNTER
Patient was told not responding to albuterol.  Patient wants to know if she should start on Dupixent.  B Davi CXR pleurisy on bottom of right lung.  B Davi sending images through [employer] [facility].

## 2025-05-02 DIAGNOSIS — J45.40 MODERATE PERSISTENT ASTHMA, UNCOMPLICATED: Primary | ICD-10-CM

## 2025-05-13 ENCOUNTER — TELEPHONE (OUTPATIENT)
Dept: PULMONOLOGY | Age: 50
End: 2025-05-13

## 2025-05-13 NOTE — TELEPHONE ENCOUNTER
S/W patient to let her know [facility] was trying to call her.  Patient said she did talk to them yesterday and gave them more information.  Patient was transferred to the pharmacist and they told her she will be susceptible to eye infection.  She has decided not to the Dupixent.